# Patient Record
Sex: FEMALE | Race: WHITE | Employment: OTHER | URBAN - METROPOLITAN AREA
[De-identification: names, ages, dates, MRNs, and addresses within clinical notes are randomized per-mention and may not be internally consistent; named-entity substitution may affect disease eponyms.]

---

## 2017-01-05 PROBLEM — D37.5 NEOPLASM OF UNCERTAIN BEHAVIOR OF STOMACH, INTESTINES, AND RECTUM: Status: ACTIVE | Noted: 2017-01-05

## 2017-01-05 PROBLEM — D37.8 NEOPLASM OF UNCERTAIN BEHAVIOR OF STOMACH, INTESTINES, AND RECTUM: Status: ACTIVE | Noted: 2017-01-05

## 2017-01-05 PROBLEM — K58.0 IRRITABLE BOWEL SYNDROME WITH DIARRHEA: Status: ACTIVE | Noted: 2017-01-05

## 2017-01-05 PROBLEM — K58.9 IRRITABLE COLON: Status: ACTIVE | Noted: 2017-01-05

## 2017-01-05 PROBLEM — D37.1 NEOPLASM OF UNCERTAIN BEHAVIOR OF STOMACH, INTESTINES, AND RECTUM: Status: ACTIVE | Noted: 2017-01-05

## 2017-01-15 ENCOUNTER — HOSPITAL ENCOUNTER (OUTPATIENT)
Facility: HOSPITAL | Age: 62
Setting detail: OBSERVATION
Discharge: HOME | End: 2017-01-16
Attending: EMERGENCY MEDICINE | Admitting: INTERNAL MEDICINE
Payer: COMMERCIAL

## 2017-01-15 ENCOUNTER — APPOINTMENT (OUTPATIENT)
Dept: GENERAL RADIOLOGY | Facility: HOSPITAL | Age: 62
End: 2017-01-15
Attending: EMERGENCY MEDICINE
Payer: COMMERCIAL

## 2017-01-15 DIAGNOSIS — R07.9 ACUTE CHEST PAIN: Primary | ICD-10-CM

## 2017-01-15 LAB
BASOPHILS # BLD AUTO: 0.07 X10(3) UL (ref 0–0.1)
BASOPHILS NFR BLD AUTO: 0.6 %
EOSINOPHIL # BLD AUTO: 0.82 X10(3) UL (ref 0–0.3)
EOSINOPHIL NFR BLD AUTO: 7 %
ERYTHROCYTE [DISTWIDTH] IN BLOOD BY AUTOMATED COUNT: 13.4 % (ref 11.5–16)
HCT VFR BLD AUTO: 35.7 % (ref 34–50)
HGB BLD-MCNC: 11.5 G/DL (ref 12–16)
IMMATURE GRANULOCYTE COUNT: 0.05 X10(3) UL (ref 0–1)
IMMATURE GRANULOCYTE RATIO %: 0.4 %
LYMPHOCYTES # BLD AUTO: 3.06 X10(3) UL (ref 0.9–4)
LYMPHOCYTES NFR BLD AUTO: 26.1 %
MCH RBC QN AUTO: 27.3 PG (ref 27–33.2)
MCHC RBC AUTO-ENTMCNC: 32.2 G/DL (ref 31–37)
MCV RBC AUTO: 84.8 FL (ref 81–100)
MONOCYTES # BLD AUTO: 0.62 X10(3) UL (ref 0.1–0.6)
MONOCYTES NFR BLD AUTO: 5.3 %
NEUTROPHIL ABS PRELIM: 7.09 X10 (3) UL (ref 1.3–6.7)
NEUTROPHILS # BLD AUTO: 7.09 X10(3) UL (ref 1.3–6.7)
NEUTROPHILS NFR BLD AUTO: 60.6 %
PLATELET # BLD AUTO: 306 10(3)UL (ref 150–450)
RBC # BLD AUTO: 4.21 X10(6)UL (ref 3.8–5.1)
RED CELL DISTRIBUTION WIDTH-SD: 41.5 FL (ref 35.1–46.3)
WBC # BLD AUTO: 11.7 X10(3) UL (ref 4–13)

## 2017-01-15 PROCEDURE — 71010 XR CHEST AP PORTABLE  (CPT=71010): CPT

## 2017-01-15 PROCEDURE — 93005 ELECTROCARDIOGRAM TRACING: CPT

## 2017-01-15 PROCEDURE — 85025 COMPLETE CBC W/AUTO DIFF WBC: CPT | Performed by: EMERGENCY MEDICINE

## 2017-01-15 PROCEDURE — 93010 ELECTROCARDIOGRAM REPORT: CPT

## 2017-01-15 PROCEDURE — 36415 COLL VENOUS BLD VENIPUNCTURE: CPT

## 2017-01-15 PROCEDURE — 99285 EMERGENCY DEPT VISIT HI MDM: CPT

## 2017-01-15 PROCEDURE — 84484 ASSAY OF TROPONIN QUANT: CPT | Performed by: EMERGENCY MEDICINE

## 2017-01-15 PROCEDURE — 80053 COMPREHEN METABOLIC PANEL: CPT | Performed by: EMERGENCY MEDICINE

## 2017-01-15 RX ORDER — INSULIN LISPRO 100 [IU]/ML
INJECTION, SOLUTION INTRAVENOUS; SUBCUTANEOUS
Status: ON HOLD | COMMUNITY
End: 2017-01-16

## 2017-01-15 RX ORDER — ASPIRIN 81 MG/1
324 TABLET, CHEWABLE ORAL ONCE
Status: COMPLETED | OUTPATIENT
Start: 2017-01-15 | End: 2017-01-15

## 2017-01-16 ENCOUNTER — APPOINTMENT (OUTPATIENT)
Dept: ULTRASOUND IMAGING | Facility: HOSPITAL | Age: 62
End: 2017-01-16
Attending: HOSPITALIST
Payer: COMMERCIAL

## 2017-01-16 ENCOUNTER — APPOINTMENT (OUTPATIENT)
Dept: CV DIAGNOSTICS | Facility: HOSPITAL | Age: 62
End: 2017-01-16
Attending: INTERNAL MEDICINE
Payer: COMMERCIAL

## 2017-01-16 VITALS
OXYGEN SATURATION: 99 % | WEIGHT: 293 LBS | BODY MASS INDEX: 47.09 KG/M2 | TEMPERATURE: 98 F | RESPIRATION RATE: 18 BRPM | HEIGHT: 66 IN | HEART RATE: 48 BPM | DIASTOLIC BLOOD PRESSURE: 73 MMHG | SYSTOLIC BLOOD PRESSURE: 128 MMHG

## 2017-01-16 PROBLEM — R07.9 ACUTE CHEST PAIN: Status: ACTIVE | Noted: 2017-01-16

## 2017-01-16 LAB
ALBUMIN SERPL-MCNC: 3.6 G/DL (ref 3.5–4.8)
ALP LIVER SERPL-CCNC: 102 U/L (ref 46–118)
ALT SERPL-CCNC: 14 U/L (ref 14–54)
AST SERPL-CCNC: 11 U/L (ref 15–41)
BASOPHILS # BLD AUTO: 0.08 X10(3) UL (ref 0–0.1)
BASOPHILS NFR BLD AUTO: 0.8 %
BILIRUB SERPL-MCNC: 0.2 MG/DL (ref 0.1–2)
BUN BLD-MCNC: 28 MG/DL (ref 8–20)
BUN BLD-MCNC: 30 MG/DL (ref 8–20)
CALCIUM BLD-MCNC: 9 MG/DL (ref 8.3–10.3)
CALCIUM BLD-MCNC: 9.3 MG/DL (ref 8.3–10.3)
CHLORIDE: 106 MMOL/L (ref 101–111)
CHLORIDE: 110 MMOL/L (ref 101–111)
CO2: 26 MMOL/L (ref 22–32)
CO2: 29 MMOL/L (ref 22–32)
CREAT BLD-MCNC: 0.76 MG/DL (ref 0.55–1.02)
CREAT BLD-MCNC: 0.92 MG/DL (ref 0.55–1.02)
D-DIMER: 0.48 UG/ML FEU (ref 0–0.49)
EOSINOPHIL # BLD AUTO: 0.75 X10(3) UL (ref 0–0.3)
EOSINOPHIL NFR BLD AUTO: 7.4 %
ERYTHROCYTE [DISTWIDTH] IN BLOOD BY AUTOMATED COUNT: 13.5 % (ref 11.5–16)
EST. AVERAGE GLUCOSE BLD GHB EST-MCNC: 171 MG/DL (ref 68–126)
GLUCOSE BLD-MCNC: 136 MG/DL (ref 65–99)
GLUCOSE BLD-MCNC: 169 MG/DL (ref 70–99)
GLUCOSE BLD-MCNC: 171 MG/DL (ref 65–99)
GLUCOSE BLD-MCNC: 171 MG/DL (ref 70–99)
GLUCOSE BLD-MCNC: 185 MG/DL (ref 65–99)
GLUCOSE BLD-MCNC: 190 MG/DL (ref 65–99)
HBA1C MFR BLD HPLC: 7.6 % (ref ?–5.7)
HCT VFR BLD AUTO: 33.6 % (ref 34–50)
HGB BLD-MCNC: 10.6 G/DL (ref 12–16)
IMMATURE GRANULOCYTE COUNT: 0.03 X10(3) UL (ref 0–1)
IMMATURE GRANULOCYTE RATIO %: 0.3 %
LYMPHOCYTES # BLD AUTO: 2.57 X10(3) UL (ref 0.9–4)
LYMPHOCYTES NFR BLD AUTO: 25.4 %
M PROTEIN MFR SERPL ELPH: 7.2 G/DL (ref 6.1–8.3)
MCH RBC QN AUTO: 27.2 PG (ref 27–33.2)
MCHC RBC AUTO-ENTMCNC: 31.5 G/DL (ref 31–37)
MCV RBC AUTO: 86.4 FL (ref 81–100)
MONOCYTES # BLD AUTO: 0.55 X10(3) UL (ref 0.1–0.6)
MONOCYTES NFR BLD AUTO: 5.4 %
NEUTROPHIL ABS PRELIM: 6.12 X10 (3) UL (ref 1.3–6.7)
NEUTROPHILS # BLD AUTO: 6.12 X10(3) UL (ref 1.3–6.7)
NEUTROPHILS NFR BLD AUTO: 60.7 %
PLATELET # BLD AUTO: 260 10(3)UL (ref 150–450)
POTASSIUM SERPL-SCNC: 3.7 MMOL/L (ref 3.6–5.1)
POTASSIUM SERPL-SCNC: 3.9 MMOL/L (ref 3.6–5.1)
RBC # BLD AUTO: 3.89 X10(6)UL (ref 3.8–5.1)
RED CELL DISTRIBUTION WIDTH-SD: 42.2 FL (ref 35.1–46.3)
SODIUM SERPL-SCNC: 141 MMOL/L (ref 136–144)
SODIUM SERPL-SCNC: 143 MMOL/L (ref 136–144)
TROPONIN: <0.046 NG/ML (ref ?–0.05)
WBC # BLD AUTO: 10.1 X10(3) UL (ref 4–13)

## 2017-01-16 PROCEDURE — 93306 TTE W/DOPPLER COMPLETE: CPT | Performed by: INTERNAL MEDICINE

## 2017-01-16 PROCEDURE — 93971 EXTREMITY STUDY: CPT

## 2017-01-16 PROCEDURE — 80048 BASIC METABOLIC PNL TOTAL CA: CPT | Performed by: HOSPITALIST

## 2017-01-16 PROCEDURE — 93306 TTE W/DOPPLER COMPLETE: CPT

## 2017-01-16 PROCEDURE — 83036 HEMOGLOBIN GLYCOSYLATED A1C: CPT | Performed by: HOSPITALIST

## 2017-01-16 PROCEDURE — 85025 COMPLETE CBC W/AUTO DIFF WBC: CPT | Performed by: HOSPITALIST

## 2017-01-16 PROCEDURE — 96372 THER/PROPH/DIAG INJ SC/IM: CPT

## 2017-01-16 PROCEDURE — 93005 ELECTROCARDIOGRAM TRACING: CPT

## 2017-01-16 PROCEDURE — 93010 ELECTROCARDIOGRAM REPORT: CPT | Performed by: INTERNAL MEDICINE

## 2017-01-16 PROCEDURE — 84484 ASSAY OF TROPONIN QUANT: CPT | Performed by: HOSPITALIST

## 2017-01-16 PROCEDURE — 82962 GLUCOSE BLOOD TEST: CPT

## 2017-01-16 PROCEDURE — 85378 FIBRIN DEGRADE SEMIQUANT: CPT | Performed by: HOSPITALIST

## 2017-01-16 RX ORDER — MORPHINE SULFATE 2 MG/ML
2 INJECTION, SOLUTION INTRAMUSCULAR; INTRAVENOUS EVERY 4 HOURS PRN
Status: DISCONTINUED | OUTPATIENT
Start: 2017-01-16 | End: 2017-01-16

## 2017-01-16 RX ORDER — ONDANSETRON 2 MG/ML
4 INJECTION INTRAMUSCULAR; INTRAVENOUS EVERY 6 HOURS PRN
Status: DISCONTINUED | OUTPATIENT
Start: 2017-01-16 | End: 2017-01-16

## 2017-01-16 RX ORDER — HEPARIN SODIUM 5000 [USP'U]/ML
5000 INJECTION, SOLUTION INTRAVENOUS; SUBCUTANEOUS EVERY 8 HOURS SCHEDULED
Status: DISCONTINUED | OUTPATIENT
Start: 2017-01-16 | End: 2017-01-16

## 2017-01-16 RX ORDER — ATORVASTATIN CALCIUM 20 MG/1
20 TABLET, FILM COATED ORAL DAILY
Status: DISCONTINUED | OUTPATIENT
Start: 2017-01-16 | End: 2017-01-16

## 2017-01-16 RX ORDER — ERGOCALCIFEROL (VITAMIN D2) 1250 MCG
CAPSULE ORAL
COMMUNITY
End: 2017-03-08

## 2017-01-16 RX ORDER — MORPHINE SULFATE 2 MG/ML
1 INJECTION, SOLUTION INTRAMUSCULAR; INTRAVENOUS EVERY 4 HOURS PRN
Status: DISCONTINUED | OUTPATIENT
Start: 2017-01-16 | End: 2017-01-16

## 2017-01-16 RX ORDER — ASPIRIN 81 MG/1
81 TABLET ORAL NIGHTLY
COMMUNITY
End: 2019-03-09

## 2017-01-16 RX ORDER — INSULIN LISPRO 100 [IU]/ML
INJECTION, SOLUTION INTRAVENOUS; SUBCUTANEOUS
COMMUNITY
End: 2017-05-04

## 2017-01-16 RX ORDER — ALPRAZOLAM 0.5 MG/1
0.5 TABLET ORAL 3 TIMES DAILY PRN
Status: DISCONTINUED | OUTPATIENT
Start: 2017-01-16 | End: 2017-01-16

## 2017-01-16 RX ORDER — ASPIRIN 325 MG
325 TABLET, DELAYED RELEASE (ENTERIC COATED) ORAL DAILY
Status: DISCONTINUED | OUTPATIENT
Start: 2017-01-16 | End: 2017-01-16

## 2017-01-16 RX ORDER — SPIRONOLACTONE 25 MG/1
25 TABLET ORAL
Status: DISCONTINUED | OUTPATIENT
Start: 2017-01-16 | End: 2017-01-16

## 2017-01-16 RX ORDER — ALPRAZOLAM 0.5 MG/1
0.5 TABLET ORAL 3 TIMES DAILY PRN
COMMUNITY
End: 2017-05-11

## 2017-01-16 RX ORDER — ACETAMINOPHEN 325 MG/1
650 TABLET ORAL EVERY 6 HOURS PRN
Status: DISCONTINUED | OUTPATIENT
Start: 2017-01-16 | End: 2017-01-16

## 2017-01-16 RX ORDER — POTASSIUM CHLORIDE 20 MEQ/1
40 TABLET, EXTENDED RELEASE ORAL ONCE
Status: COMPLETED | OUTPATIENT
Start: 2017-01-16 | End: 2017-01-16

## 2017-01-16 RX ORDER — LOSARTAN POTASSIUM AND HYDROCHLOROTHIAZIDE 25; 100 MG/1; MG/1
1 TABLET ORAL
Status: DISCONTINUED | OUTPATIENT
Start: 2017-01-16 | End: 2017-01-16 | Stop reason: RX

## 2017-01-16 RX ORDER — DEXTROSE MONOHYDRATE 25 G/50ML
50 INJECTION, SOLUTION INTRAVENOUS
Status: DISCONTINUED | OUTPATIENT
Start: 2017-01-16 | End: 2017-01-16

## 2017-01-16 NOTE — CONSULTS
[facility] Cardiology Consultation    Bernhard Schaumann Patient Status:  Observation    1955 MRN [id number]   [place] 8NE-A Attending Evert Swenson MD   Hosp Day # 1 PCP Ricki Contreras MD     Reason for Consultation:  Chest pain HISTORY Bilateral 1999    Comment bunionectomy    OTHER SURGICAL HISTORY Bilateral 2003    Comment hammertoe correction    REMOVAL GALLBLADDER      REPAIR ING HERNIA,5+Y/O,REDUCIBL      KNEE REPLACEMENT SURGERY      EGD SCOPE  6/07    TONSILLECTOMY  1964 losartan-hydrochlorothiazide (HYZAAR 100/25) combination tablet   Oral Nightly   • aspirin EC  325 mg Oral Daily   • insulin aspart  2-10 Units Subcutaneous TID CC and HS       Continuous Infusions:       Social History:   reports that she has been smoking feel she can be d/c'd. Because of CV risk factors, I would recommend o/p cardiolite. Echo pending.     Isaac Edmond  1/16/2017  12:32 PM

## 2017-01-16 NOTE — PROGRESS NOTES
01/16/17 0217 01/16/17 0220 01/16/17 0222   Vital Signs   Pulse 64 57 55   Heart Rate Source Monitor --  --    Resp 18 --  --    /40 mmHg 119/50 mmHg 120/57 mmHg   BP Location Left arm Left arm Left arm   BP Method Automatic Automatic Automatic

## 2017-01-16 NOTE — H&P
[facility] hospitalist H+P    PCP;Enrique Knutson MD  CC:  pain    HPI 62 yo female with obesity (BMI 47), HTN, Dm2, depression, came with chest and right sided upper arm pain.  Intermittent, sharp, right sided, was occuring every 30 minutes and last few SPLINT, HAMMER TOE Left 1998    CARPAL TUNNEL RELEASE Left 2015    CHOLECYSTECTOMY  1987    COLONOSCOPY  2013    Comment had multiple colonosopy's prior to 2013 also    UPPER GI ENDOSCOPY,EXAM  [facility] to Encounter:  TRADJENTA 5 MG Oral Tab TAKE 1 TABLET BY MOUTH EVERY DAY Disp: 30 tablet Rfl: 5   SERTRALINE  MG Oral Tab TAKE 1 & 1/2 TABLETS (150 MG TOTAL) BY MOUTH DAILY.  Disp: 45 tablet Rfl: 5   Atorvastatin Calcium 20 MG Oral Tab Take 1 tablet ( 27.2   MCHC  31.5   RDW  13.5   NEPRELIM  6.12   WBC  10.1   PLT  260.0       D-dimer 0.48    Na 143, k 3.9 Cl 110 Co 26 BUN 28 Creatinine 0.76 glucose 171  CXR (personally reviewed result in EPIC) no focal consolidations      Assessment/plan  Right sided

## 2017-01-16 NOTE — ED PROVIDER NOTES
Patient Seen in: [facility] Emergency Department    History   Patient presents with:  Upper Extremity Injury (musculoskeletal)    Stated Complaint: rt shoulder pain    HPI    Patient is a 53-year-old female comes into emergency room with right-sided 1993    Comment procedure to remove cyst from pancreas, developed abscess and required 2nd procedure    OTHER SURGICAL HISTORY Left 1996    Comment procedure to remove strep infection from left thigh    OTHER SURGICAL HISTORY Bilateral 1999    Comment sandra Subcutaneous Solution Pen-injector,  60 units daily   MetFORMIN HCl 1000 MG Oral Tab,  Take 1 tablet (1,000 mg total) by mouth 2 (two) times daily.    LOSARTAN POTASSIUM-HCTZ 100-25 MG Oral Tab,  TAKE 1 TABLET BY MOUTH EVERY DAY   Meclizine HCl (ANTIVERT) 2 01/15/17 2301 Temporal   SpO2 01/15/17 2301 99 %   O2 Device 01/15/17 2301 None (Room air)       Current:/57 mmHg  Pulse 55  Temp(Src) 97.7 °F (36.5 °C) (Oral)  Resp 18  Ht 167.6 cm (5' 6\")  Wt 133.7 kg  BMI 47.60 kg/m2  SpO2 100%  Breastfeeding?  No (*)     Neutrophil Absolute 7.09 (*)     Monocyte Absolute 0.62 (*)     Eosinophil Absolute 0.82 (*)     All other components within normal limits   TROPONIN I - Normal   CBC WITH DIFFERENTIAL WITH PLATELET    Narrative:      The following orders were creat radiating to right arm. Recommend admission to hospital for further workup to rule out cardiac cause of symptoms. Workup and results were discussed with patient. Patient has no other questions, complaints or concerns.  Patient will be admitted to the hosp

## 2017-01-16 NOTE — PAYOR COMM NOTE
Attending Physician: Aubrey Last MD    Review Type: ADMISSION   Reviewer: Vitaliy Canales       Date: January 16, 2017 - 9:41 AM  Payor: [id number] Number: N/A  Admit date: 1/15/2017 10:53 PM   Admitted from Emergency Dept. Units     Date Action Dose Route User    1/16/2017 0656 Given 5000 Units Subcutaneous (Right Lower Abdomen) Anila Donaldson, RN      insulin aspart (NOVOLOG) 100 UNIT/ML flexpen 2-10 Units     Date Action Dose Route User    1/16/2017 0919 Given 2 Units Subcu components within normal limits   TROPONIN I - Normal   TROPONIN I - Normal   D-DIMER - Normal    Narrative:     FEU = Fibrinogen Equivalent Units.     D-Dimer results of less than 0.5 ug/mL (FEU) have been shown to contribute to the exclusion of venous thr

## 2017-01-16 NOTE — ED INITIAL ASSESSMENT (HPI)
Pt c/o right arm pain radiating into chest, intermittently since 1600 today. Not worse with movement, no strenuous activity.

## 2017-01-17 LAB
ATRIAL RATE: 55 BPM
ATRIAL RATE: 59 BPM
P AXIS: 264 DEGREES
P AXIS: 79 DEGREES
P-R INTERVAL: 126 MS
P-R INTERVAL: 144 MS
Q-T INTERVAL: 434 MS
Q-T INTERVAL: 448 MS
QRS DURATION: 100 MS
QRS DURATION: 106 MS
QTC CALCULATION (BEZET): 428 MS
QTC CALCULATION (BEZET): 429 MS
R AXIS: 19 DEGREES
R AXIS: 26 DEGREES
T AXIS: 10 DEGREES
T AXIS: 19 DEGREES
VENTRICULAR RATE: 55 BPM
VENTRICULAR RATE: 59 BPM

## 2017-02-06 ENCOUNTER — HOSPITAL ENCOUNTER (OUTPATIENT)
Facility: HOSPITAL | Age: 62
Setting detail: HOSPITAL OUTPATIENT SURGERY
Discharge: HOME | End: 2017-02-06
Attending: INTERNAL MEDICINE | Admitting: INTERNAL MEDICINE
Payer: COMMERCIAL

## 2017-02-06 ENCOUNTER — SURGERY (OUTPATIENT)
Age: 62
End: 2017-02-06

## 2017-02-06 VITALS
OXYGEN SATURATION: 99 % | SYSTOLIC BLOOD PRESSURE: 119 MMHG | WEIGHT: 280 LBS | BODY MASS INDEX: 45 KG/M2 | HEIGHT: 66 IN | HEART RATE: 60 BPM | RESPIRATION RATE: 12 BRPM | DIASTOLIC BLOOD PRESSURE: 67 MMHG

## 2017-02-06 LAB — GLUCOSE BLDC GLUCOMTR-MCNC: 126 MG/DL (ref 70–99)

## 2017-02-06 PROCEDURE — 82962 GLUCOSE BLOOD TEST: CPT

## 2017-02-06 PROCEDURE — 88305 TISSUE EXAM BY PATHOLOGIST: CPT | Performed by: INTERNAL MEDICINE

## 2017-02-06 PROCEDURE — 0DBK8ZX EXCISION OF ASCENDING COLON, VIA NATURAL OR ARTIFICIAL OPENING ENDOSCOPIC, DIAGNOSTIC: ICD-10-PCS | Performed by: INTERNAL MEDICINE

## 2017-02-06 RX ORDER — SODIUM CHLORIDE, SODIUM LACTATE, POTASSIUM CHLORIDE, CALCIUM CHLORIDE 600; 310; 30; 20 MG/100ML; MG/100ML; MG/100ML; MG/100ML
INJECTION, SOLUTION INTRAVENOUS CONTINUOUS
Status: CANCELLED | OUTPATIENT
Start: 2017-02-06

## 2017-02-06 RX ORDER — SODIUM CHLORIDE 0.9 % (FLUSH) 0.9 %
10 SYRINGE (ML) INJECTION AS NEEDED
Status: CANCELLED | OUTPATIENT
Start: 2017-02-06

## 2017-02-06 RX ORDER — MIDAZOLAM HYDROCHLORIDE 1 MG/ML
1 INJECTION INTRAMUSCULAR; INTRAVENOUS EVERY 5 MIN PRN
Status: DISCONTINUED | OUTPATIENT
Start: 2017-02-06 | End: 2017-02-06

## 2017-02-06 NOTE — H&P
RE-PROCEDURE UPDATE    HPI: Basil Chaidez is a 63year old female. 4/25/1955. Patient presents for a colonoscopy.     ALLERGIES:   Adhesive Tape           Rash  Bactrim [Sulfametho*    Swelling    Comment:Eyelids swelled  Cefadroxil                Salsalat 1998    SPLINT, HAMMER TOE Left 1998    CARPAL TUNNEL RELEASE Left 2015    CHOLECYSTECTOMY  1987    COLONOSCOPY  2013, 2/17    Comment had multiple colonosopy's prior to 2013 also    UPPER GI ENDOSCOPY,EXAM  [address]

## 2017-02-06 NOTE — OPERATIVE REPORT
COLONOSCOPY REPORT    Patient Name:  Jenni Alva Record #: [id number]  YOB: 1955  Date of Procedure: 2/6/2017    Referring physician: Wanetta Lefort, MD    Surgeon:  Michael Kim.  Sweetie Storm MD    Pre-op diagnosis: History of polyps  (L

## 2017-02-15 PROCEDURE — 83735 ASSAY OF MAGNESIUM: CPT | Performed by: INTERNAL MEDICINE

## 2017-02-15 PROCEDURE — 85025 COMPLETE CBC W/AUTO DIFF WBC: CPT | Performed by: FAMILY MEDICINE

## 2017-02-15 PROCEDURE — 36415 COLL VENOUS BLD VENIPUNCTURE: CPT | Performed by: INTERNAL MEDICINE

## 2017-03-08 PROBLEM — IMO0002 UNCONTROLLED TYPE 2 DIABETES MELLITUS WITH STAGE 3 CHRONIC KIDNEY DISEASE, WITH LONG-TERM CURRENT USE OF INSULIN: Status: ACTIVE | Noted: 2017-03-08

## 2017-03-08 PROBLEM — N18.30 UNCONTROLLED TYPE 2 DIABETES MELLITUS WITH STAGE 3 CHRONIC KIDNEY DISEASE, WITH LONG-TERM CURRENT USE OF INSULIN (HCC): Status: ACTIVE | Noted: 2017-03-08

## 2017-03-08 PROBLEM — E11.22 UNCONTROLLED TYPE 2 DIABETES MELLITUS WITH STAGE 3 CHRONIC KIDNEY DISEASE, WITH LONG-TERM CURRENT USE OF INSULIN (HCC): Status: ACTIVE | Noted: 2017-03-08

## 2017-03-08 PROBLEM — E11.65 UNCONTROLLED TYPE 2 DIABETES MELLITUS WITH STAGE 3 CHRONIC KIDNEY DISEASE, WITH LONG-TERM CURRENT USE OF INSULIN (HCC): Status: ACTIVE | Noted: 2017-03-08

## 2017-03-08 PROBLEM — Z79.4 UNCONTROLLED TYPE 2 DIABETES MELLITUS WITH STAGE 3 CHRONIC KIDNEY DISEASE, WITH LONG-TERM CURRENT USE OF INSULIN (HCC): Status: ACTIVE | Noted: 2017-03-08

## 2017-03-08 PROCEDURE — 80061 LIPID PANEL: CPT | Performed by: INTERNAL MEDICINE

## 2017-03-08 PROCEDURE — 82306 VITAMIN D 25 HYDROXY: CPT | Performed by: INTERNAL MEDICINE

## 2017-03-08 PROCEDURE — 36415 COLL VENOUS BLD VENIPUNCTURE: CPT | Performed by: INTERNAL MEDICINE

## 2017-05-01 PROBLEM — G89.29 CHRONIC RIGHT SACROILIAC PAIN: Status: ACTIVE | Noted: 2017-05-01

## 2017-05-01 PROBLEM — M53.3 CHRONIC RIGHT SACROILIAC PAIN: Status: ACTIVE | Noted: 2017-05-01

## 2017-05-16 PROBLEM — E66.01 MORBID OBESITY WITH BMI OF 45.0-49.9, ADULT (HCC): Status: ACTIVE | Noted: 2017-05-16

## 2017-05-16 PROBLEM — Z96.653 STATUS POST TOTAL BILATERAL KNEE REPLACEMENT: Status: RESOLVED | Noted: 2017-05-16 | Resolved: 2017-05-16

## 2017-05-16 PROBLEM — Z96.653 STATUS POST TOTAL BILATERAL KNEE REPLACEMENT: Status: ACTIVE | Noted: 2017-05-16

## 2017-05-16 PROBLEM — R07.9 ACUTE CHEST PAIN: Status: RESOLVED | Noted: 2017-01-16 | Resolved: 2017-05-16

## 2017-05-16 PROBLEM — D37.1 NEOPLASM OF UNCERTAIN BEHAVIOR OF STOMACH, INTESTINES, AND RECTUM: Status: RESOLVED | Noted: 2017-01-05 | Resolved: 2017-05-16

## 2017-05-16 PROBLEM — M25.361 KNEE INSTABILITY, RIGHT: Status: RESOLVED | Noted: 2017-05-16 | Resolved: 2017-05-16

## 2017-05-16 PROBLEM — M25.361 KNEE INSTABILITY, RIGHT: Status: ACTIVE | Noted: 2017-05-16

## 2017-05-16 PROBLEM — D37.5 NEOPLASM OF UNCERTAIN BEHAVIOR OF STOMACH, INTESTINES, AND RECTUM: Status: RESOLVED | Noted: 2017-01-05 | Resolved: 2017-05-16

## 2017-05-16 PROBLEM — D37.8 NEOPLASM OF UNCERTAIN BEHAVIOR OF STOMACH, INTESTINES, AND RECTUM: Status: RESOLVED | Noted: 2017-01-05 | Resolved: 2017-05-16

## 2017-05-26 PROBLEM — G89.29 CHRONIC PAIN OF RIGHT KNEE: Status: ACTIVE | Noted: 2017-05-26

## 2017-05-26 PROBLEM — M25.561 CHRONIC PAIN OF RIGHT KNEE: Status: ACTIVE | Noted: 2017-05-26

## 2017-08-02 PROBLEM — M18.12 ARTHRITIS OF CARPOMETACARPAL (CMC) JOINT OF LEFT THUMB: Status: ACTIVE | Noted: 2017-08-02

## 2017-09-13 ENCOUNTER — HOSPITAL ENCOUNTER (EMERGENCY)
Facility: HOSPITAL | Age: 62
Discharge: HOME | End: 2017-09-13
Attending: EMERGENCY MEDICINE
Payer: MEDICARE

## 2017-09-13 ENCOUNTER — APPOINTMENT (OUTPATIENT)
Dept: GENERAL RADIOLOGY | Facility: HOSPITAL | Age: 62
End: 2017-09-13
Attending: EMERGENCY MEDICINE
Payer: MEDICARE

## 2017-09-13 VITALS
HEIGHT: 66 IN | OXYGEN SATURATION: 100 % | BODY MASS INDEX: 47.09 KG/M2 | SYSTOLIC BLOOD PRESSURE: 116 MMHG | TEMPERATURE: 97 F | DIASTOLIC BLOOD PRESSURE: 57 MMHG | HEART RATE: 18 BPM | WEIGHT: 293 LBS | RESPIRATION RATE: 16 BRPM

## 2017-09-13 DIAGNOSIS — I49.1 PAC (PREMATURE ATRIAL CONTRACTION): Primary | ICD-10-CM

## 2017-09-13 LAB
ALBUMIN SERPL-MCNC: 3.5 G/DL (ref 3.5–4.8)
ALP LIVER SERPL-CCNC: 110 U/L (ref 46–118)
ALT SERPL-CCNC: 15 U/L (ref 14–54)
AST SERPL-CCNC: 12 U/L (ref 15–41)
BASOPHILS # BLD AUTO: 0.04 X10(3) UL (ref 0–0.1)
BASOPHILS NFR BLD AUTO: 0.3 %
BILIRUB SERPL-MCNC: 0.2 MG/DL (ref 0.1–2)
BUN BLD-MCNC: 26 MG/DL (ref 8–20)
CALCIUM BLD-MCNC: 9.6 MG/DL (ref 8.3–10.3)
CHLORIDE: 104 MMOL/L (ref 101–111)
CO2: 29 MMOL/L (ref 22–32)
CREAT BLD-MCNC: 1.07 MG/DL (ref 0.55–1.02)
EOSINOPHIL # BLD AUTO: 0.79 X10(3) UL (ref 0–0.3)
EOSINOPHIL NFR BLD AUTO: 6.7 %
ERYTHROCYTE [DISTWIDTH] IN BLOOD BY AUTOMATED COUNT: 14 % (ref 11.5–16)
GLUCOSE BLD-MCNC: 186 MG/DL (ref 70–99)
HCT VFR BLD AUTO: 36.8 % (ref 34–50)
HGB BLD-MCNC: 11.7 G/DL (ref 12–16)
IMMATURE GRANULOCYTE COUNT: 0.04 X10(3) UL (ref 0–1)
IMMATURE GRANULOCYTE RATIO %: 0.3 %
LYMPHOCYTES # BLD AUTO: 3.11 X10(3) UL (ref 0.9–4)
LYMPHOCYTES NFR BLD AUTO: 26.5 %
M PROTEIN MFR SERPL ELPH: 7.6 G/DL (ref 6.1–8.3)
MCH RBC QN AUTO: 27.3 PG (ref 27–33.2)
MCHC RBC AUTO-ENTMCNC: 31.8 G/DL (ref 31–37)
MCV RBC AUTO: 85.8 FL (ref 81–100)
MONOCYTES # BLD AUTO: 0.7 X10(3) UL (ref 0.1–0.6)
MONOCYTES NFR BLD AUTO: 6 %
NEUTROPHIL ABS PRELIM: 7.04 X10 (3) UL (ref 1.3–6.7)
NEUTROPHILS # BLD AUTO: 7.04 X10(3) UL (ref 1.3–6.7)
NEUTROPHILS NFR BLD AUTO: 60.2 %
PLATELET # BLD AUTO: 295 10(3)UL (ref 150–450)
POTASSIUM SERPL-SCNC: 3.8 MMOL/L (ref 3.6–5.1)
RBC # BLD AUTO: 4.29 X10(6)UL (ref 3.8–5.1)
RED CELL DISTRIBUTION WIDTH-SD: 43.7 FL (ref 35.1–46.3)
SODIUM SERPL-SCNC: 140 MMOL/L (ref 136–144)
TROPONIN: <0.046 NG/ML (ref ?–0.05)
WBC # BLD AUTO: 11.7 X10(3) UL (ref 4–13)

## 2017-09-13 PROCEDURE — 93005 ELECTROCARDIOGRAM TRACING: CPT

## 2017-09-13 PROCEDURE — 93010 ELECTROCARDIOGRAM REPORT: CPT

## 2017-09-13 PROCEDURE — 80053 COMPREHEN METABOLIC PANEL: CPT | Performed by: EMERGENCY MEDICINE

## 2017-09-13 PROCEDURE — 96360 HYDRATION IV INFUSION INIT: CPT

## 2017-09-13 PROCEDURE — 71010 XR CHEST AP PORTABLE  (CPT=71010): CPT | Performed by: EMERGENCY MEDICINE

## 2017-09-13 PROCEDURE — 85025 COMPLETE CBC W/AUTO DIFF WBC: CPT | Performed by: EMERGENCY MEDICINE

## 2017-09-13 PROCEDURE — 99285 EMERGENCY DEPT VISIT HI MDM: CPT

## 2017-09-13 PROCEDURE — 84484 ASSAY OF TROPONIN QUANT: CPT | Performed by: EMERGENCY MEDICINE

## 2017-09-13 RX ORDER — SODIUM CHLORIDE 9 MG/ML
125 INJECTION, SOLUTION INTRAVENOUS CONTINUOUS
Status: DISCONTINUED | OUTPATIENT
Start: 2017-09-13 | End: 2017-09-14

## 2017-09-14 LAB
ATRIAL RATE: 64 BPM
P AXIS: 72 DEGREES
P-R INTERVAL: 136 MS
Q-T INTERVAL: 430 MS
QRS DURATION: 100 MS
QTC CALCULATION (BEZET): 443 MS
R AXIS: 14 DEGREES
T AXIS: 19 DEGREES
VENTRICULAR RATE: 64 BPM

## 2017-09-14 NOTE — ED PROVIDER NOTES
Patient Seen in: [facility] Emergency Department    History   Patient presents with:  Arrythmia/Palpitations (cardiovascular)    Stated Complaint: heart palpitations x 2 days    HPI    63-year-old female comes the hospital the chief complaint of hav Location: [facility] ENDOSCOPY  1996: CORRECT CHARLES NIX METHODS Right  1996: CORRECT CHARLES NIX METHODS Left  6/07: EGD SCOPE  1992: ERCP,DIAGNOSTIC  1991: HERNIA SURGERY      Comment: umbilical hernia   33/1629: HERNIA SURGERY      Comment: abdominal hernia x palpitations x 2 days  Other systems are as noted in HPI. Constitutional and vital signs reviewed. All other systems reviewed and negative except as noted above. PSFH elements reviewed from today and agreed except as otherwise stated in HPI.     Ph Abnormal            Final result                 Please view results for these tests on the individual orders.    RAINBOW DRAW BLUE   RAINBOW DRAW LAVENDER   RAINBOW DRAW LIGHT GREEN   RAINBOW DRAW GOLD     EKG    Rate, intervals and axes as noted on

## 2017-09-14 NOTE — ED INITIAL ASSESSMENT (HPI)
C/o intermittent palpitations for 3 days.  Pt denies CP, denies ROSEMARY, denies dizziness/lightheadedness

## 2017-09-14 NOTE — ED NOTES
Unsuccesful PIV insertion x 2 attempts. Pt tolerated well. Will ask ultrasound guided insertion by another RN

## 2017-10-05 PROBLEM — M72.2 PLANTAR FASCIITIS, BILATERAL: Status: ACTIVE | Noted: 2017-10-05

## 2017-11-16 ENCOUNTER — HOSPITAL ENCOUNTER (EMERGENCY)
Facility: HOSPITAL | Age: 62
Discharge: HOME | End: 2017-11-16
Attending: EMERGENCY MEDICINE
Payer: MEDICARE

## 2017-11-16 VITALS
SYSTOLIC BLOOD PRESSURE: 140 MMHG | BODY MASS INDEX: 45.52 KG/M2 | HEART RATE: 82 BPM | RESPIRATION RATE: 16 BRPM | TEMPERATURE: 97 F | OXYGEN SATURATION: 100 % | DIASTOLIC BLOOD PRESSURE: 69 MMHG | HEIGHT: 67 IN | WEIGHT: 290 LBS
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
## 2017-11-16 PROCEDURE — 90471 IMMUNIZATION ADMIN: CPT

## 2017-11-16 PROCEDURE — 99283 EMERGENCY DEPT VISIT LOW MDM: CPT

## 2017-11-16 RX ORDER — AMOXICILLIN AND CLAVULANATE POTASSIUM 875; 125 MG/1; MG/1
1 TABLET, FILM COATED ORAL ONCE
Status: COMPLETED | OUTPATIENT
Start: 2017-11-16 | End: 2017-11-16

## 2017-11-16 RX ORDER — AMOXICILLIN AND CLAVULANATE POTASSIUM 875; 125 MG/1; MG/1
1 TABLET, FILM COATED ORAL 2 TIMES DAILY
Qty: 14 TABLET | Refills: 0 | Status: SHIPPED | OUTPATIENT
Start: 2017-11-16 | End: 2017-11-23

## 2017-11-16 NOTE — ED PROVIDER NOTES
Patient Seen in: [facility] Emergency Department    History   Patient presents with:  Bite (integumentary)    Stated Complaint: dog bite today    HPI    Patient is a 63-year-old woman who accidentally got bitten by her dog in the right hand today. procedure to remove cyst from pancreas,                developed abscess and required 2nd procedure  1996: OTHER SURGICAL HISTORY Left      Comment: procedure to remove strep infection from left                thigh  1999: OTHER SURGICAL HISTORY Bilateral murmurs. ABDOMEN: Soft, nontender, nondistended,   EXTREMITIES: Peripheral pulses are brisk in all 4 extremities. Normal capillary refill. Small scratch to right fourth finger. No active bleeding. No sign of crush injury.   No erythema or lymphangitic

## 2018-04-03 PROCEDURE — 87329 GIARDIA AG IA: CPT | Performed by: INTERNAL MEDICINE

## 2018-04-03 PROCEDURE — 87272 CRYPTOSPORIDIUM AG IF: CPT | Performed by: INTERNAL MEDICINE

## 2018-04-03 PROCEDURE — 82656 EL-1 FECAL QUAL/SEMIQ: CPT | Performed by: INTERNAL MEDICINE

## 2018-04-03 PROCEDURE — 82705 FATS/LIPIDS FECES QUAL: CPT | Performed by: INTERNAL MEDICINE

## 2018-04-11 ENCOUNTER — LAB ENCOUNTER (OUTPATIENT)
Dept: LAB | Age: 63
End: 2018-04-11
Attending: INTERNAL MEDICINE
Payer: COMMERCIAL

## 2018-04-11 DIAGNOSIS — E11.9 DIABETES MELLITUS WITH NO COMPLICATION (HCC): ICD-10-CM

## 2018-04-11 DIAGNOSIS — I10 ESSENTIAL HYPERTENSION: ICD-10-CM

## 2018-04-11 PROCEDURE — 82043 UR ALBUMIN QUANTITATIVE: CPT | Performed by: INTERNAL MEDICINE

## 2018-04-11 PROCEDURE — 81003 URINALYSIS AUTO W/O SCOPE: CPT

## 2018-04-11 PROCEDURE — 82570 ASSAY OF URINE CREATININE: CPT | Performed by: INTERNAL MEDICINE

## 2018-04-26 PROBLEM — G89.29 CHRONIC RIGHT SI JOINT PAIN: Status: ACTIVE | Noted: 2017-05-01

## 2018-04-26 PROBLEM — M53.3 CHRONIC RIGHT SI JOINT PAIN: Status: ACTIVE | Noted: 2017-05-01

## 2018-05-03 PROBLEM — M20.42 HAMMER TOES OF BOTH FEET: Status: ACTIVE | Noted: 2018-05-03

## 2018-05-03 PROBLEM — M20.41 HAMMER TOES OF BOTH FEET: Status: ACTIVE | Noted: 2018-05-03

## 2018-05-03 PROCEDURE — 83883 ASSAY NEPHELOMETRY NOT SPEC: CPT | Performed by: INTERNAL MEDICINE

## 2018-05-03 PROCEDURE — 86334 IMMUNOFIX E-PHORESIS SERUM: CPT | Performed by: INTERNAL MEDICINE

## 2018-05-03 PROCEDURE — 82785 ASSAY OF IGE: CPT | Performed by: INTERNAL MEDICINE

## 2018-05-03 PROCEDURE — 84260 ASSAY OF SEROTONIN: CPT | Performed by: INTERNAL MEDICINE

## 2018-05-03 PROCEDURE — 84165 PROTEIN E-PHORESIS SERUM: CPT | Performed by: INTERNAL MEDICINE

## 2018-05-03 PROCEDURE — 82784 ASSAY IGA/IGD/IGG/IGM EACH: CPT | Performed by: INTERNAL MEDICINE

## 2018-06-01 ENCOUNTER — EKG ENCOUNTER (OUTPATIENT)
Dept: LAB | Facility: HOSPITAL | Age: 63
End: 2018-06-01
Attending: INTERNAL MEDICINE
Payer: COMMERCIAL

## 2018-06-01 DIAGNOSIS — R00.0 IRREGULAR TACHYCARDIA: Primary | ICD-10-CM

## 2018-06-01 PROCEDURE — 93010 ELECTROCARDIOGRAM REPORT: CPT | Performed by: INTERNAL MEDICINE

## 2018-06-01 PROCEDURE — 93005 ELECTROCARDIOGRAM TRACING: CPT

## 2018-06-22 PROCEDURE — 87624 HPV HI-RISK TYP POOLED RSLT: CPT | Performed by: INTERNAL MEDICINE

## 2018-06-22 PROCEDURE — 88175 CYTOPATH C/V AUTO FLUID REDO: CPT | Performed by: INTERNAL MEDICINE

## 2018-07-15 ENCOUNTER — HOSPITAL ENCOUNTER (EMERGENCY)
Facility: HOSPITAL | Age: 63
Discharge: HOME | End: 2018-07-15
Attending: EMERGENCY MEDICINE
Payer: COMMERCIAL

## 2018-07-15 ENCOUNTER — APPOINTMENT (OUTPATIENT)
Dept: CT IMAGING | Facility: HOSPITAL | Age: 63
End: 2018-07-15
Attending: EMERGENCY MEDICINE
Payer: COMMERCIAL

## 2018-07-15 VITALS
DIASTOLIC BLOOD PRESSURE: 82 MMHG | SYSTOLIC BLOOD PRESSURE: 117 MMHG | TEMPERATURE: 102 F | RESPIRATION RATE: 14 BRPM | BODY MASS INDEX: 46.61 KG/M2 | OXYGEN SATURATION: 95 % | WEIGHT: 290 LBS | HEART RATE: 77 BPM | HEIGHT: 66 IN

## 2018-07-15 DIAGNOSIS — K52.9 GASTROENTERITIS: Primary | ICD-10-CM

## 2018-07-15 LAB
ALBUMIN SERPL-MCNC: 3.2 G/DL (ref 3.5–4.8)
ALP LIVER SERPL-CCNC: 99 U/L (ref 50–130)
ALT SERPL-CCNC: 16 U/L (ref 14–54)
AST SERPL-CCNC: 23 U/L (ref 15–41)
BASOPHILS # BLD AUTO: 0.03 X10(3) UL (ref 0–0.1)
BASOPHILS NFR BLD AUTO: 0.2 %
BILIRUB SERPL-MCNC: 0.4 MG/DL (ref 0.1–2)
BILIRUB UR QL STRIP.AUTO: NEGATIVE
BUN BLD-MCNC: 19 MG/DL (ref 8–20)
CALCIUM BLD-MCNC: 9.1 MG/DL (ref 8.3–10.3)
CHLORIDE: 104 MMOL/L (ref 101–111)
CO2: 23 MMOL/L (ref 22–32)
COLOR UR AUTO: YELLOW
CREAT BLD-MCNC: 0.99 MG/DL (ref 0.55–1.02)
EOSINOPHIL # BLD AUTO: 0.26 X10(3) UL (ref 0–0.3)
EOSINOPHIL NFR BLD AUTO: 1.4 %
ERYTHROCYTE [DISTWIDTH] IN BLOOD BY AUTOMATED COUNT: 13.5 % (ref 11.5–16)
GLUCOSE BLD-MCNC: 202 MG/DL (ref 70–99)
GLUCOSE UR STRIP.AUTO-MCNC: NEGATIVE MG/DL
HCT VFR BLD AUTO: 35.2 % (ref 34–50)
HGB BLD-MCNC: 11.3 G/DL (ref 12–16)
IMMATURE GRANULOCYTE COUNT: 0.09 X10(3) UL (ref 0–1)
IMMATURE GRANULOCYTE RATIO %: 0.5 %
KETONES UR STRIP.AUTO-MCNC: NEGATIVE MG/DL
LEUKOCYTE ESTERASE UR QL STRIP.AUTO: NEGATIVE
LYMPHOCYTES # BLD AUTO: 0.86 X10(3) UL (ref 0.9–4)
LYMPHOCYTES NFR BLD AUTO: 4.5 %
M PROTEIN MFR SERPL ELPH: 7.6 G/DL (ref 6.1–8.3)
MCH RBC QN AUTO: 27.3 PG (ref 27–33.2)
MCHC RBC AUTO-ENTMCNC: 32.1 G/DL (ref 31–37)
MCV RBC AUTO: 85 FL (ref 81–100)
MONOCYTES # BLD AUTO: 1.11 X10(3) UL (ref 0.1–1)
MONOCYTES NFR BLD AUTO: 5.8 %
NEUTROPHIL ABS PRELIM: 16.87 X10 (3) UL (ref 1.3–6.7)
NEUTROPHILS # BLD AUTO: 16.87 X10(3) UL (ref 1.3–6.7)
NEUTROPHILS NFR BLD AUTO: 87.6 %
NITRITE UR QL STRIP.AUTO: NEGATIVE
PH UR STRIP.AUTO: 5 [PH] (ref 4.5–8)
PLATELET # BLD AUTO: 273 10(3)UL (ref 150–450)
POTASSIUM SERPL-SCNC: 4 MMOL/L (ref 3.6–5.1)
PROT UR STRIP.AUTO-MCNC: 30 MG/DL
RBC # BLD AUTO: 4.14 X10(6)UL (ref 3.8–5.1)
RBC UR QL AUTO: NEGATIVE
RED CELL DISTRIBUTION WIDTH-SD: 42.5 FL (ref 35.1–46.3)
SODIUM SERPL-SCNC: 136 MMOL/L (ref 136–144)
SP GR UR STRIP.AUTO: 1.02 (ref 1–1.03)
UROBILINOGEN UR STRIP.AUTO-MCNC: <2 MG/DL
WBC # BLD AUTO: 19.2 X10(3) UL (ref 4–13)

## 2018-07-15 PROCEDURE — 96374 THER/PROPH/DIAG INJ IV PUSH: CPT

## 2018-07-15 PROCEDURE — 96361 HYDRATE IV INFUSION ADD-ON: CPT

## 2018-07-15 PROCEDURE — 80053 COMPREHEN METABOLIC PANEL: CPT | Performed by: EMERGENCY MEDICINE

## 2018-07-15 PROCEDURE — 74177 CT ABD & PELVIS W/CONTRAST: CPT | Performed by: EMERGENCY MEDICINE

## 2018-07-15 PROCEDURE — 85025 COMPLETE CBC W/AUTO DIFF WBC: CPT | Performed by: EMERGENCY MEDICINE

## 2018-07-15 PROCEDURE — 99284 EMERGENCY DEPT VISIT MOD MDM: CPT

## 2018-07-15 PROCEDURE — 81001 URINALYSIS AUTO W/SCOPE: CPT | Performed by: EMERGENCY MEDICINE

## 2018-07-15 RX ORDER — ACETAMINOPHEN 500 MG
1000 TABLET ORAL ONCE
Status: COMPLETED | OUTPATIENT
Start: 2018-07-15 | End: 2018-07-15

## 2018-07-15 RX ORDER — ONDANSETRON 4 MG/1
4 TABLET, ORALLY DISINTEGRATING ORAL EVERY 4 HOURS PRN
Qty: 10 TABLET | Refills: 0 | Status: SHIPPED | OUTPATIENT
Start: 2018-07-15 | End: 2018-07-22

## 2018-07-15 RX ORDER — ONDANSETRON 2 MG/ML
4 INJECTION INTRAMUSCULAR; INTRAVENOUS ONCE
Status: COMPLETED | OUTPATIENT
Start: 2018-07-15 | End: 2018-07-15

## 2018-07-15 RX ORDER — IBUPROFEN 600 MG/1
600 TABLET ORAL ONCE
Status: COMPLETED | OUTPATIENT
Start: 2018-07-15 | End: 2018-07-15

## 2018-07-15 NOTE — ED INITIAL ASSESSMENT (HPI)
Patient presents with nausea, vomiting, diarrhea, fever, and chills since Thursday. She also has lower abdominal pain.

## 2018-07-15 NOTE — ED PROVIDER NOTES
Patient Seen in: [facility] Emergency Department    History   Patient presents with:  Nausea/Vomiting/Diarrhea (gastrointestinal)  Fever (infectious)    Stated Complaint: fever, vomiting    HPI    22-year-old female comes the hospital complaint havi Procedure: COLONOSCOPY;  Surgeon: Joy Sibley MD;  Location: [facility] ENDOSCOPY  1996: CORRECT BUNION,OTHR METHODS Right  1996: CORRECT BUNION,OTHR METHODS Left  6/07: EGD SCOPE  1992: ERCP,DIAGNOSTIC  1991: HERNIA SURGERY      Comment: umb (Oral)   Resp 14   Ht 167.6 cm (5' 6\")   Wt 131.5 kg   SpO2 95%   BMI 46.81 kg/m²         Physical Exam    HEENT : NCAT, EOMI, PEERL, throat clear, neck supple, no JVD, trachea midline, No LAD  Heart: S1S2 normal. No murmurs, regular rate and rhythm  Lung AG, EIA     Ct Abdomen+pelvis(contrast Only)(cpt=74177)    Result Date: 7/15/2018  PROCEDURE:  CT ABDOMEN+PELVIS (CONTRAST ONLY) (CPT=74177)  COMPARISON:  [facility] , CT ABDOMEN+PELVIS(CPT=74176), 7/04/2016, 13:43.   INDICATIONS:  fever, vomiting  TECHNIQUE: colon wall thickening predominately involving the transverse colon. No inflammatory changes are seen adjacent to the thick-walled colon. The duodenum is mildly fluid filled and  distended. No evidence for small bowel obstruction.  ABDOMINAL WALL:  There (OMNIPAQUE) 350 MG/ML injection 100 mL (100 mL Intravenous Given 7/15/18 4524)     The patient was unable to have a bowel movement for collection at this particular time but is feeling better after the above.   Patient discharged home with outpatient manage

## 2018-07-17 ENCOUNTER — APPOINTMENT (OUTPATIENT)
Dept: LAB | Facility: HOSPITAL | Age: 63
End: 2018-07-17
Attending: EMERGENCY MEDICINE
Payer: COMMERCIAL

## 2018-07-17 LAB
CRYPTOSP AG STL QL IA: NEGATIVE
G LAMBLIA AG STL QL IA: NEGATIVE
ROTAVIRUS AG EIA: NEGATIVE

## 2018-07-17 PROCEDURE — 87425 ROTAVIRUS AG IA: CPT | Performed by: EMERGENCY MEDICINE

## 2018-07-17 PROCEDURE — 87046 STOOL CULTR AEROBIC BACT EA: CPT | Performed by: EMERGENCY MEDICINE

## 2018-07-17 PROCEDURE — 87077 CULTURE AEROBIC IDENTIFY: CPT | Performed by: EMERGENCY MEDICINE

## 2018-07-17 PROCEDURE — 87045 FECES CULTURE AEROBIC BACT: CPT | Performed by: EMERGENCY MEDICINE

## 2018-07-17 PROCEDURE — 87177 OVA AND PARASITES SMEARS: CPT | Performed by: EMERGENCY MEDICINE

## 2018-07-17 PROCEDURE — 87493 C DIFF AMPLIFIED PROBE: CPT | Performed by: EMERGENCY MEDICINE

## 2018-07-17 PROCEDURE — 87427 SHIGA-LIKE TOXIN AG IA: CPT | Performed by: EMERGENCY MEDICINE

## 2018-07-17 PROCEDURE — 82272 OCCULT BLD FECES 1-3 TESTS: CPT | Performed by: EMERGENCY MEDICINE

## 2018-07-17 PROCEDURE — 87329 GIARDIA AG IA: CPT | Performed by: EMERGENCY MEDICINE

## 2018-07-17 PROCEDURE — 87272 CRYPTOSPORIDIUM AG IF: CPT | Performed by: EMERGENCY MEDICINE

## 2018-07-17 PROCEDURE — 87209 SMEAR COMPLEX STAIN: CPT | Performed by: EMERGENCY MEDICINE

## 2018-07-20 LAB
OVA AND PARASITE, TRICHROME STAIN: NEGATIVE
OVA AND PARASITE, WET MOUNT: NEGATIVE

## 2018-08-31 PROCEDURE — 87040 BLOOD CULTURE FOR BACTERIA: CPT | Performed by: EMERGENCY MEDICINE

## 2018-09-05 PROBLEM — M72.2 PLANTAR FASCIITIS: Status: ACTIVE | Noted: 2018-09-05

## 2018-09-06 PROBLEM — M70.62 TROCHANTERIC BURSITIS OF BOTH HIPS: Status: ACTIVE | Noted: 2018-09-06

## 2018-09-06 PROBLEM — M70.61 TROCHANTERIC BURSITIS OF BOTH HIPS: Status: ACTIVE | Noted: 2018-09-06

## 2019-01-17 PROCEDURE — 82570 ASSAY OF URINE CREATININE: CPT | Performed by: INTERNAL MEDICINE

## 2019-01-17 PROCEDURE — 82043 UR ALBUMIN QUANTITATIVE: CPT | Performed by: INTERNAL MEDICINE

## 2019-08-26 ENCOUNTER — APPOINTMENT (OUTPATIENT)
Dept: GENERAL RADIOLOGY | Facility: HOSPITAL | Age: 64
End: 2019-08-26
Attending: EMERGENCY MEDICINE
Payer: COMMERCIAL

## 2019-08-26 ENCOUNTER — HOSPITAL ENCOUNTER (EMERGENCY)
Facility: HOSPITAL | Age: 64
Discharge: HOME | End: 2019-08-27
Attending: EMERGENCY MEDICINE
Payer: COMMERCIAL

## 2019-08-26 DIAGNOSIS — S20.211A CONTUSION OF RIGHT CHEST WALL, INITIAL ENCOUNTER: Primary | ICD-10-CM

## 2019-08-26 PROCEDURE — 99283 EMERGENCY DEPT VISIT LOW MDM: CPT

## 2019-08-26 PROCEDURE — 71101 X-RAY EXAM UNILAT RIBS/CHEST: CPT | Performed by: EMERGENCY MEDICINE

## 2019-08-26 RX ORDER — CYCLOBENZAPRINE HCL 10 MG
10 TABLET ORAL 3 TIMES DAILY PRN
Status: DISCONTINUED | OUTPATIENT
Start: 2019-08-26 | End: 2019-08-27

## 2019-08-26 RX ORDER — TRAMADOL HYDROCHLORIDE 50 MG/1
50 TABLET ORAL ONCE
Status: COMPLETED | OUTPATIENT
Start: 2019-08-26 | End: 2019-08-26

## 2019-08-27 VITALS
SYSTOLIC BLOOD PRESSURE: 135 MMHG | OXYGEN SATURATION: 98 % | RESPIRATION RATE: 14 BRPM | HEIGHT: 66 IN | WEIGHT: 280 LBS | TEMPERATURE: 97 F | DIASTOLIC BLOOD PRESSURE: 66 MMHG | HEART RATE: 88 BPM | BODY MASS INDEX: 45 KG/M2

## 2019-08-27 RX ORDER — CYCLOBENZAPRINE HCL 10 MG
10 TABLET ORAL 3 TIMES DAILY PRN
Qty: 20 TABLET | Refills: 0 | Status: SHIPPED | OUTPATIENT
Start: 2019-08-27 | End: 2019-08-30

## 2019-08-27 RX ORDER — TRAMADOL HYDROCHLORIDE 50 MG/1
TABLET ORAL EVERY 6 HOURS PRN
Qty: 10 TABLET | Refills: 0 | Status: SHIPPED | OUTPATIENT
Start: 2019-08-27 | End: 2019-08-30

## 2019-08-27 NOTE — ED PROVIDER NOTES
Patient Seen in: [facility] Emergency Department    History   Patient presents with:  Fall (musculoskeletal, neurologic)  Trauma (cardiovascular, musculoskeletal)    Stated Complaint: right rib pain/fall    HPI    82-year-old woman presents to the e MD JOVI at [address] [place] METHODS Left 1996   • EGD SCOPE  6/07   • ERCP,DIAGNOSTIC  1992   • HERNIA SURGERY  3443    umbilical hernia    • HERNIA SURGERY  12/1992    abdominal hernia x 7   • 5/2015   • TOTAL KNEE REPLACEMENT Right 7/2015   • UPPER GI ENDOSCOPY,EXAM  1992                    Social History    Tobacco Use      Smoking status: Current Some Day Smoker        Packs/day: 0.01        Years: 25.00        Pack years: .25        Types: C having right side rib pain just under right     breast.               FINDINGS:      LUNGS:  No significant pulmonary parenchymal abnormalities and normal     vascularity. CARDIAC:  Normal size cardiac silhouette.     MEDIASTINUM:  Normal.    PLEURA:  No

## 2019-08-27 NOTE — ED INITIAL ASSESSMENT (HPI)
Pt presents to ed c/o sharp right rib pain at a 10/10 that is increased with inspiration [place] airport [place] at approx 1830. Pt denies head or neck pain or injury. Pt denies dizziness, states her knee buckled causing the fall.  Pt is

## 2019-11-07 PROBLEM — M54.16 LUMBAR RADICULITIS: Status: ACTIVE | Noted: 2019-11-07

## 2019-11-07 PROBLEM — M46.1 SACROILIITIS (HCC): Status: ACTIVE | Noted: 2019-11-07

## 2020-01-23 PROBLEM — J42 ACUTE EXACERBATION OF CHRONIC BRONCHITIS (HCC): Status: ACTIVE | Noted: 2020-01-23

## 2020-01-23 PROBLEM — M25.561 CHRONIC PAIN OF RIGHT KNEE: Status: RESOLVED | Noted: 2017-05-26 | Resolved: 2020-01-23

## 2020-01-23 PROBLEM — G89.29 CHRONIC PAIN OF RIGHT KNEE: Status: RESOLVED | Noted: 2017-05-26 | Resolved: 2020-01-23

## 2020-01-23 PROBLEM — D82.4 HYPER-IGE SYNDROME (HCC): Status: ACTIVE | Noted: 2020-01-23

## 2020-01-23 PROBLEM — J20.9 ACUTE EXACERBATION OF CHRONIC BRONCHITIS (HCC): Status: ACTIVE | Noted: 2020-01-23

## 2020-02-02 PROBLEM — J42 ACUTE EXACERBATION OF CHRONIC BRONCHITIS (HCC): Status: RESOLVED | Noted: 2020-01-23 | Resolved: 2020-02-02

## 2020-02-02 PROBLEM — J20.9 ACUTE EXACERBATION OF CHRONIC BRONCHITIS (HCC): Status: RESOLVED | Noted: 2020-01-23 | Resolved: 2020-02-02

## 2020-03-16 ENCOUNTER — HOSPITAL ENCOUNTER (EMERGENCY)
Facility: HOSPITAL | Age: 65
Discharge: HOME | End: 2020-03-16
Attending: EMERGENCY MEDICINE
Payer: COMMERCIAL

## 2020-03-16 ENCOUNTER — APPOINTMENT (OUTPATIENT)
Dept: GENERAL RADIOLOGY | Facility: HOSPITAL | Age: 65
End: 2020-03-16
Attending: EMERGENCY MEDICINE
Payer: COMMERCIAL

## 2020-03-16 VITALS
RESPIRATION RATE: 18 BRPM | TEMPERATURE: 98 F | OXYGEN SATURATION: 100 % | HEART RATE: 62 BPM | WEIGHT: 275 LBS | DIASTOLIC BLOOD PRESSURE: 52 MMHG | BODY MASS INDEX: 44.2 KG/M2 | HEIGHT: 66 IN | SYSTOLIC BLOOD PRESSURE: 96 MMHG

## 2020-03-16 DIAGNOSIS — S20.211A CONTUSION OF RIB ON RIGHT SIDE, INITIAL ENCOUNTER: Primary | ICD-10-CM

## 2020-03-16 PROCEDURE — 99283 EMERGENCY DEPT VISIT LOW MDM: CPT | Performed by: EMERGENCY MEDICINE

## 2020-03-16 PROCEDURE — 71101 X-RAY EXAM UNILAT RIBS/CHEST: CPT | Performed by: EMERGENCY MEDICINE

## 2020-03-16 RX ORDER — TRAMADOL HYDROCHLORIDE 50 MG/1
TABLET ORAL EVERY 6 HOURS PRN
Qty: 10 TABLET | Refills: 0 | Status: SHIPPED | OUTPATIENT
Start: 2020-03-16 | End: 2020-03-21

## 2020-03-16 RX ORDER — HYDROCODONE BITARTRATE AND ACETAMINOPHEN 5; 325 MG/1; MG/1
2 TABLET ORAL ONCE
Status: DISCONTINUED | OUTPATIENT
Start: 2020-03-16 | End: 2020-03-16

## 2020-03-16 RX ORDER — LIDOCAINE 50 MG/G
1 PATCH TOPICAL EVERY 24 HOURS
Qty: 6 PATCH | Refills: 0 | Status: SHIPPED | OUTPATIENT
Start: 2020-03-16 | End: 2020-03-20

## 2020-03-16 RX ORDER — TRAMADOL HYDROCHLORIDE 50 MG/1
50 TABLET ORAL ONCE
Status: COMPLETED | OUTPATIENT
Start: 2020-03-16 | End: 2020-03-16

## 2020-03-16 NOTE — ED INITIAL ASSESSMENT (HPI)
Patient ambulatory to ED, reports she was walking her dog on Saturday, her knee gave out, she landed on her right side. + right sided rib pain, hard to take a deep breath, denies SOB. Denies hitting head or LOC.

## 2020-03-18 NOTE — ED PROVIDER NOTES
Patient Seen in: [facility] Emergency Department      History   Patient presents with:  Trauma    Stated Complaint: Right rib pain post fall Saturday, ambulatory. HPI    71-year-old woman presents to the emergency department with right rib pain. Performed by Wesley Schwartz MD at [address]   •      •      • CARPAL TUNNEL RELEASE Left    • CEA, PANCREATIC CYST FLUID      had pancreatic cyst surgery   • CHOLECYSTECTOMY     • COLONOSCOPY  2 9/4/2018    Performed by Alejandra Ashraf MD at [address]   • [address] RF Right 5/30/2018    Performed by Cecil Cohen MD at [facility], HAMMER TOE Right 1998   • SPLINT, HAMMER TOE Left 1998   • SURGER noted.  Her heart has a regular rate and rhythm without murmurs rubs or gallops her abdomen is obese but soft nontender nondistended. Her upper and lower extremities are benign she is moving all 4 extremities they are atraumatic to the touch.   She is awak written for. Lidoderm patches given. Stable for discharge home. All questions have been answered.           Disposition and Plan     Clinical Impression:  Contusion of rib on right side, initial encounter  (primary encounter diagnosis)    Disposition:  D

## 2020-07-20 PROBLEM — E11.69 HYPERLIPIDEMIA ASSOCIATED WITH TYPE 2 DIABETES MELLITUS (HCC): Status: ACTIVE | Noted: 2020-07-20

## 2020-07-20 PROBLEM — E11.69 HYPERLIPIDEMIA ASSOCIATED WITH TYPE 2 DIABETES MELLITUS: Status: ACTIVE | Noted: 2020-07-20

## 2020-07-20 PROBLEM — E78.5 HYPERLIPIDEMIA ASSOCIATED WITH TYPE 2 DIABETES MELLITUS  (HCC): Status: ACTIVE | Noted: 2020-07-20

## 2020-07-20 PROBLEM — Z79.4 TYPE 2 DIABETES MELLITUS WITH MODERATE NONPROLIFERATIVE RETINOPATHY WITHOUT MACULAR EDEMA, WITH LONG-TERM CURRENT USE OF INSULIN, UNSPECIFIED LATERALITY (HCC): Status: ACTIVE | Noted: 2020-07-20

## 2020-07-20 PROBLEM — E11.65 TYPE 2 DIABETES MELLITUS WITH HYPERGLYCEMIA, WITH LONG-TERM CURRENT USE OF INSULIN (HCC): Status: ACTIVE | Noted: 2020-07-20

## 2020-07-20 PROBLEM — Z79.4 TYPE 2 DIABETES MELLITUS WITH HYPERGLYCEMIA, WITH LONG-TERM CURRENT USE OF INSULIN (HCC): Status: ACTIVE | Noted: 2020-07-20

## 2020-07-20 PROBLEM — E11.69 HYPERLIPIDEMIA ASSOCIATED WITH TYPE 2 DIABETES MELLITUS  (HCC): Status: ACTIVE | Noted: 2020-07-20

## 2020-07-20 PROBLEM — E78.5 HYPERLIPIDEMIA ASSOCIATED WITH TYPE 2 DIABETES MELLITUS: Status: ACTIVE | Noted: 2020-07-20

## 2020-07-20 PROBLEM — E11.3399 TYPE 2 DIABETES MELLITUS WITH MODERATE NONPROLIFERATIVE RETINOPATHY WITHOUT MACULAR EDEMA, WITH LONG-TERM CURRENT USE OF INSULIN, UNSPECIFIED LATERALITY (HCC): Status: ACTIVE | Noted: 2020-07-20

## 2020-07-20 PROBLEM — E78.5 HYPERLIPIDEMIA ASSOCIATED WITH TYPE 2 DIABETES MELLITUS (HCC): Status: ACTIVE | Noted: 2020-07-20

## 2020-09-03 PROBLEM — M75.101 ROTATOR CUFF SYNDROME OF RIGHT SHOULDER: Status: ACTIVE | Noted: 2020-09-03

## 2020-10-19 ENCOUNTER — LAB ENCOUNTER (OUTPATIENT)
Dept: LAB | Facility: HOSPITAL | Age: 65
End: 2020-10-19
Attending: STUDENT IN AN ORGANIZED HEALTH CARE EDUCATION/TRAINING PROGRAM
Payer: MEDICARE

## 2020-10-19 DIAGNOSIS — R19.7 DIARRHEA, UNSPECIFIED TYPE: ICD-10-CM

## 2020-10-19 PROCEDURE — 87493 C DIFF AMPLIFIED PROBE: CPT

## 2020-10-19 PROCEDURE — 87045 FECES CULTURE AEROBIC BACT: CPT

## 2020-10-19 PROCEDURE — 87046 STOOL CULTR AEROBIC BACT EA: CPT

## 2020-10-19 PROCEDURE — 87077 CULTURE AEROBIC IDENTIFY: CPT

## 2020-10-27 PROBLEM — A04.72 C. DIFFICILE DIARRHEA: Status: ACTIVE | Noted: 2020-10-27

## 2020-10-29 ENCOUNTER — LAB ENCOUNTER (OUTPATIENT)
Dept: LAB | Facility: HOSPITAL | Age: 65
End: 2020-10-29
Attending: INTERNAL MEDICINE
Payer: MEDICARE

## 2020-10-29 DIAGNOSIS — R19.7 DIARRHEA OF PRESUMED INFECTIOUS ORIGIN: ICD-10-CM

## 2020-10-29 PROCEDURE — 87493 C DIFF AMPLIFIED PROBE: CPT

## 2020-10-30 NOTE — PROGRESS NOTES
10/30/2020  [address] [facility] [address]    Dear Autumn Bhat,       Here are your results from your recent visit with Gastroenterology. C Diff is negative. Tried to call you at 3:30pm Friday but your cell phone would not ring.     If y

## 2020-12-07 PROBLEM — D72.19 OTHER EOSINOPHILIA: Status: ACTIVE | Noted: 2020-10-17

## 2021-01-19 ENCOUNTER — LAB ENCOUNTER (OUTPATIENT)
Dept: LAB | Facility: HOSPITAL | Age: 66
End: 2021-01-19
Attending: INTERNAL MEDICINE
Payer: MEDICARE

## 2021-01-19 DIAGNOSIS — A04.72 C. DIFFICILE DIARRHEA: ICD-10-CM

## 2021-01-19 DIAGNOSIS — K58.0 IRRITABLE BOWEL SYNDROME WITH DIARRHEA: ICD-10-CM

## 2021-01-19 PROCEDURE — 87493 C DIFF AMPLIFIED PROBE: CPT

## 2021-01-20 LAB — C DIFF TOX B STL QL: NEGATIVE

## 2021-01-20 NOTE — PROGRESS NOTES
1/20/2021  [address] [facility] [address]    Dear Virginia Fisher,       Here are your results from your recent visit with Gastroenterology. Your stool test was negative for C Diff. Let us know how you are doing.     If you need any further

## 2021-05-26 ENCOUNTER — LAB ENCOUNTER (OUTPATIENT)
Dept: LAB | Facility: HOSPITAL | Age: 66
End: 2021-05-26
Attending: INTERNAL MEDICINE
Payer: MEDICARE

## 2021-05-26 DIAGNOSIS — R19.7 DIARRHEA, UNSPECIFIED TYPE: ICD-10-CM

## 2021-05-26 PROCEDURE — 83993 ASSAY FOR CALPROTECTIN FECAL: CPT

## 2021-05-26 PROCEDURE — 87493 C DIFF AMPLIFIED PROBE: CPT

## 2021-05-27 NOTE — PROGRESS NOTES
5/27/2021  [facility] [address] [address]    Dear Roberto Porter,       Here are your results from your recent visit with Gastroenterology. C. difficile was negative. The calprotectin was minimally elevated raising the possibility of some in

## 2021-06-21 PROBLEM — A04.72 C. DIFFICILE DIARRHEA: Status: RESOLVED | Noted: 2020-10-27 | Resolved: 2021-06-21

## 2021-07-04 ENCOUNTER — HOSPITAL ENCOUNTER (EMERGENCY)
Facility: HOSPITAL | Age: 66
Discharge: HOME | End: 2021-07-04
Attending: EMERGENCY MEDICINE
Payer: MEDICARE

## 2021-07-04 ENCOUNTER — APPOINTMENT (OUTPATIENT)
Dept: ULTRASOUND IMAGING | Facility: HOSPITAL | Age: 66
End: 2021-07-04
Attending: EMERGENCY MEDICINE
Payer: MEDICARE

## 2021-07-04 VITALS
OXYGEN SATURATION: 97 % | HEIGHT: 66 IN | DIASTOLIC BLOOD PRESSURE: 108 MMHG | HEART RATE: 101 BPM | BODY MASS INDEX: 43.87 KG/M2 | RESPIRATION RATE: 16 BRPM | TEMPERATURE: 97 F | WEIGHT: 273 LBS | SYSTOLIC BLOOD PRESSURE: 134 MMHG

## 2021-07-04 DIAGNOSIS — R22.42 LOCALIZED SWELLING OF LEFT LOWER EXTREMITY: Primary | ICD-10-CM

## 2021-07-04 PROCEDURE — 99284 EMERGENCY DEPT VISIT MOD MDM: CPT

## 2021-07-04 PROCEDURE — 93971 EXTREMITY STUDY: CPT | Performed by: EMERGENCY MEDICINE

## 2021-07-04 NOTE — ED PROVIDER NOTES
Patient Seen in: [facility] Emergency Department      History   Patient presents with:  Deep Vein Thrombosis    Stated Complaint: left leg pain, behind knee since yesterday. also swelling.     HPI/Subjective:   HPI    71-year-old female who presents CORRECT BUNION,OTHR METHODS Right 1996   • CORRECT BUNION,OTHR METHODS Left 1996   • EGD SCOPE  6/07   • ERCP,DIAGNOSTIC  1992   • HERNIA SURGERY  4810    umbilical hernia    • HERNIA SURGERY  12/1992    abdominal hernia x 7   • KNEE REPLACEMENT SURGERY (36.3 °C) (Temporal)   Resp 16   Ht 167.6 cm (5' 6\")   Wt 123.8 kg   LMP 01/01/2009   SpO2 97%   BMI 44.06 kg/m²         Physical Exam  General: 71-year-old female who presents to the emergency department with lower extremity discomfort.   Appears to be no  Negative for DVT.             Dictated by (CST): Zulma Rosas MD on 7/04/2021 at [address] by (CST): Zulma Rosas MD on 7/04/2021 at 4:13 PM                 MDM      Sent for an ultrasound of the left lower extremity. To rule out DVT.

## 2021-07-22 ENCOUNTER — HOSPITAL ENCOUNTER (EMERGENCY)
Facility: HOSPITAL | Age: 66
Discharge: HOME | End: 2021-07-23
Attending: EMERGENCY MEDICINE
Payer: MEDICARE

## 2021-07-22 DIAGNOSIS — U07.1 COVID-19: Primary | ICD-10-CM

## 2021-07-22 LAB — AMB EXT COVID-19 RESULT: DETECTED

## 2021-07-22 PROCEDURE — 99284 EMERGENCY DEPT VISIT MOD MDM: CPT

## 2021-07-22 PROCEDURE — 99453 REM MNTR PHYSIOL PARAM SETUP: CPT

## 2021-07-23 VITALS
RESPIRATION RATE: 16 BRPM | BODY MASS INDEX: 43.9 KG/M2 | DIASTOLIC BLOOD PRESSURE: 79 MMHG | HEART RATE: 105 BPM | OXYGEN SATURATION: 98 % | WEIGHT: 273.13 LBS | SYSTOLIC BLOOD PRESSURE: 130 MMHG | TEMPERATURE: 99 F | HEIGHT: 66 IN

## 2021-07-23 RX ORDER — SODIUM CHLORIDE 9 MG/ML
1000 INJECTION, SOLUTION INTRAVENOUS ONCE
Status: COMPLETED | OUTPATIENT
Start: 2021-07-23 | End: 2021-07-23

## 2021-07-23 RX ORDER — ACETAMINOPHEN 500 MG
1000 TABLET ORAL ONCE
Status: COMPLETED | OUTPATIENT
Start: 2021-07-23 | End: 2021-07-23

## 2021-07-23 NOTE — ED PROVIDER NOTES
Patient Seen in: [facility] Emergency Department      History   Patient presents with:  Covid    Stated Complaint: covid symptoms since saturday, here for an infusion      HPI/Subjective:   HPI    80-year-old female who is fully vaccinated for Covid hernia x 7   • KNEE REPLACEMENT SURGERY     • OTHER SURGICAL HISTORY  12/03/2014    cysto Dr. Keshia Santos   • Sasha Gall    procedure to remove cyst from pancreas, developed abscess and required 2nd procedure   • OTHER SURGICAL HISTORY Left 1996 HENT:      Head: Normocephalic and atraumatic. Eyes:      Conjunctiva/sclera: Conjunctivae normal.      Pupils: Pupils are equal, round, and reactive to light. Cardiovascular:      Rate and Rhythm: Normal rate and regular rhythm.       Heart sounds: N under this Emergency Use Authorization. The patient has agreed and will receive the infusion and be monitored for 60 minutes after.    They have been advised that they should continue to self-isolate and use infection control measures (e.g., wear mask, isol

## 2021-08-17 ENCOUNTER — APPOINTMENT (OUTPATIENT)
Dept: GENERAL RADIOLOGY | Facility: HOSPITAL | Age: 66
End: 2021-08-17
Attending: EMERGENCY MEDICINE
Payer: MEDICARE

## 2021-08-17 ENCOUNTER — HOSPITAL ENCOUNTER (OUTPATIENT)
Facility: HOSPITAL | Age: 66
Setting detail: OBSERVATION
LOS: 1 days | Discharge: HOME | End: 2021-08-18
Attending: EMERGENCY MEDICINE | Admitting: INTERNAL MEDICINE
Payer: MEDICARE

## 2021-08-17 DIAGNOSIS — R07.9 CHEST PAIN OF UNCERTAIN ETIOLOGY: Primary | ICD-10-CM

## 2021-08-17 DIAGNOSIS — I48.92 ATRIAL FLUTTER, UNSPECIFIED TYPE (HCC): ICD-10-CM

## 2021-08-17 LAB
ALBUMIN SERPL-MCNC: 3.7 G/DL (ref 3.4–5)
ALBUMIN/GLOB SERPL: 0.9 {RATIO} (ref 1–2)
ALP LIVER SERPL-CCNC: 110 U/L
ALT SERPL-CCNC: 18 U/L
ANION GAP SERPL CALC-SCNC: 5 MMOL/L (ref 0–18)
APTT PPP: 32.7 SECONDS (ref 25.4–36.1)
AST SERPL-CCNC: 15 U/L (ref 15–37)
BASOPHILS # BLD AUTO: 0.05 X10(3) UL (ref 0–0.2)
BASOPHILS NFR BLD AUTO: 0.4 %
BILIRUB SERPL-MCNC: 0.4 MG/DL (ref 0.1–2)
BUN BLD-MCNC: 27 MG/DL (ref 7–18)
CALCIUM BLD-MCNC: 9.3 MG/DL (ref 8.5–10.1)
CHLORIDE SERPL-SCNC: 102 MMOL/L (ref 98–112)
CO2 SERPL-SCNC: 31 MMOL/L (ref 21–32)
CREAT BLD-MCNC: 1.22 MG/DL
D-DIMER: 0.27 UG/ML FEU (ref ?–0.64)
EOSINOPHIL # BLD AUTO: 0.49 X10(3) UL (ref 0–0.7)
EOSINOPHIL NFR BLD AUTO: 4.2 %
ERYTHROCYTE [DISTWIDTH] IN BLOOD BY AUTOMATED COUNT: 14.8 %
GLOBULIN PLAS-MCNC: 4.1 G/DL (ref 2.8–4.4)
GLUCOSE BLD-MCNC: 124 MG/DL (ref 70–99)
GLUCOSE BLD-MCNC: 73 MG/DL (ref 70–99)
HCT VFR BLD AUTO: 42.5 %
HGB BLD-MCNC: 13.9 G/DL
IMM GRANULOCYTES # BLD AUTO: 0.02 X10(3) UL (ref 0–1)
IMM GRANULOCYTES NFR BLD: 0.2 %
LYMPHOCYTES # BLD AUTO: 3.46 X10(3) UL (ref 1–4)
LYMPHOCYTES NFR BLD AUTO: 29.8 %
M PROTEIN MFR SERPL ELPH: 7.8 G/DL (ref 6.4–8.2)
MCH RBC QN AUTO: 27.3 PG (ref 26–34)
MCHC RBC AUTO-ENTMCNC: 32.7 G/DL (ref 31–37)
MCV RBC AUTO: 83.3 FL
MONOCYTES # BLD AUTO: 0.67 X10(3) UL (ref 0.1–1)
MONOCYTES NFR BLD AUTO: 5.8 %
NEUTROPHILS # BLD AUTO: 6.93 X10 (3) UL (ref 1.5–7.7)
NEUTROPHILS # BLD AUTO: 6.93 X10(3) UL (ref 1.5–7.7)
NEUTROPHILS NFR BLD AUTO: 59.6 %
OSMOLALITY SERPL CALC.SUM OF ELEC: 290 MOSM/KG (ref 275–295)
PLATELET # BLD AUTO: 297 10(3)UL (ref 150–450)
POTASSIUM SERPL-SCNC: 2.8 MMOL/L (ref 3.5–5.1)
RBC # BLD AUTO: 5.1 X10(6)UL
SARS-COV-2 RNA RESP QL NAA+PROBE: NOT DETECTED
SODIUM SERPL-SCNC: 138 MMOL/L (ref 136–145)
TROPONIN I SERPL-MCNC: <0.045 NG/ML (ref ?–0.04)
WBC # BLD AUTO: 11.6 X10(3) UL (ref 4–11)

## 2021-08-17 PROCEDURE — 82962 GLUCOSE BLOOD TEST: CPT

## 2021-08-17 PROCEDURE — 85025 COMPLETE CBC W/AUTO DIFF WBC: CPT | Performed by: EMERGENCY MEDICINE

## 2021-08-17 PROCEDURE — 93010 ELECTROCARDIOGRAM REPORT: CPT

## 2021-08-17 PROCEDURE — 93005 ELECTROCARDIOGRAM TRACING: CPT

## 2021-08-17 PROCEDURE — 96365 THER/PROPH/DIAG IV INF INIT: CPT

## 2021-08-17 PROCEDURE — 80053 COMPREHEN METABOLIC PANEL: CPT | Performed by: EMERGENCY MEDICINE

## 2021-08-17 PROCEDURE — 99285 EMERGENCY DEPT VISIT HI MDM: CPT

## 2021-08-17 PROCEDURE — 85730 THROMBOPLASTIN TIME PARTIAL: CPT | Performed by: EMERGENCY MEDICINE

## 2021-08-17 PROCEDURE — 71045 X-RAY EXAM CHEST 1 VIEW: CPT | Performed by: EMERGENCY MEDICINE

## 2021-08-17 PROCEDURE — 85379 FIBRIN DEGRADATION QUANT: CPT | Performed by: EMERGENCY MEDICINE

## 2021-08-17 PROCEDURE — 96366 THER/PROPH/DIAG IV INF ADDON: CPT

## 2021-08-17 PROCEDURE — 84484 ASSAY OF TROPONIN QUANT: CPT | Performed by: EMERGENCY MEDICINE

## 2021-08-17 RX ORDER — HEPARIN SODIUM 5000 [USP'U]/ML
5000 INJECTION INTRAVENOUS; SUBCUTANEOUS ONCE
Status: COMPLETED | OUTPATIENT
Start: 2021-08-17 | End: 2021-08-17

## 2021-08-17 RX ORDER — POTASSIUM CHLORIDE 20 MEQ/1
40 TABLET, EXTENDED RELEASE ORAL ONCE
Status: COMPLETED | OUTPATIENT
Start: 2021-08-17 | End: 2021-08-17

## 2021-08-17 RX ORDER — TORSEMIDE 5 MG/1
5 TABLET ORAL DAILY
Status: DISCONTINUED | OUTPATIENT
Start: 2021-08-17 | End: 2021-08-18

## 2021-08-17 RX ORDER — BISACODYL 10 MG
10 SUPPOSITORY, RECTAL RECTAL
Status: DISCONTINUED | OUTPATIENT
Start: 2021-08-17 | End: 2021-08-18

## 2021-08-17 RX ORDER — ASPIRIN 81 MG/1
324 TABLET, CHEWABLE ORAL ONCE
Status: COMPLETED | OUTPATIENT
Start: 2021-08-17 | End: 2021-08-17

## 2021-08-17 RX ORDER — BUPROPION HYDROCHLORIDE 150 MG/1
150 TABLET ORAL DAILY
Status: DISCONTINUED | OUTPATIENT
Start: 2021-08-17 | End: 2021-08-18

## 2021-08-17 RX ORDER — DEXTROSE MONOHYDRATE 25 G/50ML
50 INJECTION, SOLUTION INTRAVENOUS
Status: DISCONTINUED | OUTPATIENT
Start: 2021-08-17 | End: 2021-08-18

## 2021-08-17 RX ORDER — HEPARIN SODIUM AND DEXTROSE 10000; 5 [USP'U]/100ML; G/100ML
1000 INJECTION INTRAVENOUS ONCE
Status: COMPLETED | OUTPATIENT
Start: 2021-08-17 | End: 2021-08-18

## 2021-08-17 RX ORDER — ACETAMINOPHEN 325 MG/1
650 TABLET ORAL EVERY 6 HOURS PRN
Status: DISCONTINUED | OUTPATIENT
Start: 2021-08-17 | End: 2021-08-18

## 2021-08-17 RX ORDER — ATORVASTATIN CALCIUM 20 MG/1
20 TABLET, FILM COATED ORAL DAILY
Status: DISCONTINUED | OUTPATIENT
Start: 2021-08-17 | End: 2021-08-18

## 2021-08-17 RX ORDER — POLYETHYLENE GLYCOL 3350 17 G/17G
17 POWDER, FOR SOLUTION ORAL DAILY PRN
Status: DISCONTINUED | OUTPATIENT
Start: 2021-08-17 | End: 2021-08-18

## 2021-08-17 RX ORDER — SODIUM PHOSPHATE, DIBASIC AND SODIUM PHOSPHATE, MONOBASIC 7; 19 G/133ML; G/133ML
1 ENEMA RECTAL ONCE AS NEEDED
Status: DISCONTINUED | OUTPATIENT
Start: 2021-08-17 | End: 2021-08-18

## 2021-08-17 RX ORDER — HEPARIN SODIUM AND DEXTROSE 10000; 5 [USP'U]/100ML; G/100ML
INJECTION INTRAVENOUS CONTINUOUS
Status: DISCONTINUED | OUTPATIENT
Start: 2021-08-18 | End: 2021-08-18

## 2021-08-17 RX ORDER — ASPIRIN 81 MG/1
81 TABLET ORAL DAILY
Status: DISCONTINUED | OUTPATIENT
Start: 2021-08-17 | End: 2021-08-18

## 2021-08-17 RX ORDER — ALPRAZOLAM 0.5 MG/1
0.5 TABLET ORAL EVERY 6 HOURS PRN
Status: DISCONTINUED | OUTPATIENT
Start: 2021-08-17 | End: 2021-08-18

## 2021-08-17 RX ORDER — GABAPENTIN 300 MG/1
300 CAPSULE ORAL 3 TIMES DAILY
Status: DISCONTINUED | OUTPATIENT
Start: 2021-08-17 | End: 2021-08-18

## 2021-08-17 RX ORDER — NORTRIPTYLINE HYDROCHLORIDE 25 MG/1
25 CAPSULE ORAL EVERY EVENING
Status: DISCONTINUED | OUTPATIENT
Start: 2021-08-17 | End: 2021-08-18

## 2021-08-17 RX ORDER — ONDANSETRON 2 MG/ML
4 INJECTION INTRAMUSCULAR; INTRAVENOUS EVERY 6 HOURS PRN
Status: DISCONTINUED | OUTPATIENT
Start: 2021-08-17 | End: 2021-08-18

## 2021-08-17 RX ORDER — PROCHLORPERAZINE EDISYLATE 5 MG/ML
5 INJECTION INTRAMUSCULAR; INTRAVENOUS EVERY 8 HOURS PRN
Status: DISCONTINUED | OUTPATIENT
Start: 2021-08-17 | End: 2021-08-18

## 2021-08-17 NOTE — ED INITIAL ASSESSMENT (HPI)
Pt rpeorts having pain on inspiration for past few days, speaking in full sentences.  Pt reports occasional dizziness, no syncope, had bp reading of systolic 598 at chiropractor yesterday

## 2021-08-18 ENCOUNTER — APPOINTMENT (OUTPATIENT)
Dept: CV DIAGNOSTICS | Facility: HOSPITAL | Age: 66
End: 2021-08-18
Attending: INTERNAL MEDICINE
Payer: MEDICARE

## 2021-08-18 VITALS
HEART RATE: 108 BPM | HEIGHT: 66 IN | SYSTOLIC BLOOD PRESSURE: 118 MMHG | DIASTOLIC BLOOD PRESSURE: 66 MMHG | TEMPERATURE: 98 F | BODY MASS INDEX: 44.15 KG/M2 | RESPIRATION RATE: 18 BRPM | WEIGHT: 274.69 LBS | OXYGEN SATURATION: 97 %

## 2021-08-18 LAB
ANION GAP SERPL CALC-SCNC: 3 MMOL/L (ref 0–18)
APTT PPP: 59.1 SECONDS (ref 25.4–36.1)
APTT PPP: 66.3 SECONDS (ref 25.4–36.1)
ATRIAL RATE: 256 BPM
BUN BLD-MCNC: 25 MG/DL (ref 7–18)
CALCIUM BLD-MCNC: 8.9 MG/DL (ref 8.5–10.1)
CHLORIDE SERPL-SCNC: 107 MMOL/L (ref 98–112)
CO2 SERPL-SCNC: 29 MMOL/L (ref 21–32)
CREAT BLD-MCNC: 1.03 MG/DL
GLUCOSE BLD-MCNC: 108 MG/DL (ref 70–99)
GLUCOSE BLD-MCNC: 114 MG/DL (ref 70–99)
GLUCOSE BLD-MCNC: 119 MG/DL (ref 70–99)
GLUCOSE BLD-MCNC: 61 MG/DL (ref 70–99)
GLUCOSE BLD-MCNC: 92 MG/DL (ref 70–99)
OSMOLALITY SERPL CALC.SUM OF ELEC: 293 MOSM/KG (ref 275–295)
P AXIS: -83 DEGREES
POTASSIUM SERPL-SCNC: 3.4 MMOL/L (ref 3.5–5.1)
Q-T INTERVAL: 374 MS
QRS DURATION: 106 MS
QTC CALCULATION (BEZET): 462 MS
R AXIS: 9 DEGREES
SODIUM SERPL-SCNC: 139 MMOL/L (ref 136–145)
T AXIS: -42 DEGREES
TROPONIN I SERPL-MCNC: <0.045 NG/ML (ref ?–0.04)
TROPONIN I SERPL-MCNC: <0.045 NG/ML (ref ?–0.04)
VENTRICULAR RATE: 92 BPM

## 2021-08-18 PROCEDURE — 84132 ASSAY OF SERUM POTASSIUM: CPT | Performed by: INTERNAL MEDICINE

## 2021-08-18 PROCEDURE — 80048 BASIC METABOLIC PNL TOTAL CA: CPT | Performed by: INTERNAL MEDICINE

## 2021-08-18 PROCEDURE — 93018 CV STRESS TEST I&R ONLY: CPT | Performed by: INTERNAL MEDICINE

## 2021-08-18 PROCEDURE — 78452 HT MUSCLE IMAGE SPECT MULT: CPT | Performed by: INTERNAL MEDICINE

## 2021-08-18 PROCEDURE — 82962 GLUCOSE BLOOD TEST: CPT

## 2021-08-18 PROCEDURE — 85730 THROMBOPLASTIN TIME PARTIAL: CPT | Performed by: INTERNAL MEDICINE

## 2021-08-18 PROCEDURE — 85730 THROMBOPLASTIN TIME PARTIAL: CPT | Performed by: EMERGENCY MEDICINE

## 2021-08-18 PROCEDURE — B246ZZZ ULTRASONOGRAPHY OF RIGHT AND LEFT HEART: ICD-10-PCS | Performed by: INTERNAL MEDICINE

## 2021-08-18 PROCEDURE — 93017 CV STRESS TEST TRACING ONLY: CPT | Performed by: INTERNAL MEDICINE

## 2021-08-18 PROCEDURE — 84484 ASSAY OF TROPONIN QUANT: CPT | Performed by: INTERNAL MEDICINE

## 2021-08-18 PROCEDURE — 96366 THER/PROPH/DIAG IV INF ADDON: CPT

## 2021-08-18 PROCEDURE — 93306 TTE W/DOPPLER COMPLETE: CPT | Performed by: INTERNAL MEDICINE

## 2021-08-18 RX ORDER — POTASSIUM CHLORIDE 20 MEQ/1
40 TABLET, EXTENDED RELEASE ORAL EVERY 4 HOURS
Status: COMPLETED | OUTPATIENT
Start: 2021-08-18 | End: 2021-08-18

## 2021-08-18 RX ORDER — METOPROLOL SUCCINATE 25 MG/1
25 TABLET, EXTENDED RELEASE ORAL
Qty: 30 TABLET | Refills: 0 | Status: SHIPPED | OUTPATIENT
Start: 2021-08-19 | End: 2021-09-15

## 2021-08-18 RX ORDER — DIAPER,BRIEF,INFANT-TODD,DISP
EACH MISCELLANEOUS 2 TIMES DAILY PRN
Status: DISCONTINUED | OUTPATIENT
Start: 2021-08-18 | End: 2021-08-18

## 2021-08-18 RX ORDER — POTASSIUM CHLORIDE 20 MEQ/1
40 TABLET, EXTENDED RELEASE ORAL DAILY
Status: DISCONTINUED | OUTPATIENT
Start: 2021-08-18 | End: 2021-08-18

## 2021-08-18 RX ORDER — METOPROLOL SUCCINATE 25 MG/1
25 TABLET, EXTENDED RELEASE ORAL
Status: DISCONTINUED | OUTPATIENT
Start: 2021-08-18 | End: 2021-08-18

## 2021-08-18 NOTE — PLAN OF CARE
Preliminary nuclear stress test results as called to me by Dr. Janine Turk:    Negative for perfusion abnormalities and LVEF 52%. Stable CV status for DC.      RAFAT Florez

## 2021-08-18 NOTE — PROGRESS NOTES
CARDIODIAGNOSTIC PRELIMINARY REPORT:    Doreen Mckeon completed, tolerated well    Second set of images pending

## 2021-08-18 NOTE — PLAN OF CARE
NURSING ADMISSION NOTE        Patient admitted via Cart  Oriented to room. Safety precautions initiated. Bed in low position.        08/17/21 2259 08/17/21 2307 08/17/21 2313   Vital Signs   BP 98/49 104/54 100/59   MAP (mmHg) 59 65 69   BP Location Right

## 2021-08-18 NOTE — CM/SW NOTE
Patient failed inpatient criteria. Second level of review completed and supports observation. UR committee in agreement. UR discussed with Dr. Joelle Bliss who approves observation status. Observation letter given to the patient and order written.     Ritika Mcgee

## 2021-08-18 NOTE — H&P
General Medicine H&P     Patient presents with:  Difficulty Breathing       PCP: Kori Galan MD    History of Present Illness: Patient is a 61year old female with PMH including but not limited to anxiety, DM, depression, GERD, HTN, Hl, obesity, who [address] METHODS Left 1996   • EGD SCOPE  6/07   • ERCP,DIAGNOSTIC  1992   • HERNIA SURGERY  7514    umbilical hernia    • HERNIA SURGERY  12/1992    abdominal hernia x 7   • KNEE REPLACEMENT SURGERY     • OTHER SURGICAL HISTORY  12/03/2014    kandi Rosario Quit date: 2019        Years since quittin.6      Smokeless tobacco: Never Used    Alcohol use:  Yes      Alcohol/week: 0.0 standard drinks      Comment: rare 2 drinks per year       Fam Hx  Family History   Problem Relation Age of Onset   • Hype  08/18/2021    K 3.4 08/18/2021     08/18/2021    CO2 29.0 08/18/2021     08/18/2021    CA 8.9 08/18/2021    ALB 3.7 08/17/2021    ALKPHO 110 08/17/2021    BILT 0.4 08/17/2021    TP 7.8 08/17/2021    AST 15 08/17/2021    ALT 18 08/17/202 ADA diet.  Will hold PO meds  - levemir 60u daily    # HL  -statin    # Major Depression/ Generalized anxiety d/o   -reviewed home meds, continue wellbutrin, currently appears stable     # Neuropathy  - gabapentin    # tob abuse  -Smokes tob off and on, pac

## 2021-08-18 NOTE — DISCHARGE PLANNING
D/C orders received. IV removed, IV catheter intact. Follow up appointments discussed. New meds prescribed to pharmacy. Discharge paperwork given and discussed. Patient taken to [facility] in wheelchair via transport.

## 2021-08-18 NOTE — ED PROVIDER NOTES
Patient Seen in: [facility] Emergency Department      History   Patient presents with:  Difficulty Breathing    Stated Complaint: coretta    HPI/Subjective:   HPI    Patient is a 60-year-old female comes to emergency room for evaluation of chest heavine COLONOSCOPY;  Surgeon: Jd Senior MD;  Location: [address] [place] [address] ENDOSCOPY   • CORRECT BUNION,OTHR METHODS Right 1996   • [place] METHODS Left 1996   • EGD SCOPE  6/07   • ERCP,DIAGNOSTIC  1992   • HERNIA SURGERY  5367    umbilical hernia    • HERNI 98.9 °F (37.2 °C) (Temporal)   Resp 18   Ht 167.6 cm (5' 6\")   Wt 120.2 kg   LMP 01/01/2009   SpO2 100%   BMI 42.77 kg/m²         Physical Exam    GENERAL: No acute distress, well appearing and non-toxic, Alert and oriented X 3   HEENT: Normocephalic, atr DIFFERENTIAL[[id number]]          Abnormal            Final result                 Please view results for these tests on the individual orders. EKG    Rate, intervals and axes as noted on EKG Report.   Rate: 92  Rhythm: Atrial flutter with variable bloc Case discussed with hospitalist Dr. Jessica Jolly as well as cardiology, Dr. Sonia Gonzalez         MDM      Patient is a 44-year-old female comes emergency room for evaluation of chest pain. Symptoms concerning for angina. EKG shows atrial flutter.   Heart score 5

## 2021-08-18 NOTE — PLAN OF CARE
Assumed care of pt at 0730. Pt A&Ox4. Aflutter on tele. Lung sounds clear and diminished bilaterally on room air. Pt denies pain and SOB at this time. Heparin gtt infusing per protocol. Plan for echo and lexiscan today. NPO since MN.  Pt and family updated

## 2021-08-18 NOTE — ED QUICK NOTES
Orders for admission, patient is aware of plan and ready to go upstairs. Any questions, please call ED KORY Berrios at extension [phone]. Vaccinated?  Yes  Type of COVID test sent: Rapid  COVID Suspicion level: Low       Titratable drug(s) infusing: n/a  Rate:

## 2021-08-18 NOTE — CONSULTS
[facility] Cardiology Consultation    [facility] Patient Status:  Observation    1955 MRN [id number]   [facility] 8NE-A Attending Yannick Messer MD   Hosp Day # 0 PCP Kori Galan MD     Reason for Consultation:  Chest pain, afib, umbilical hernia    • HERNIA SURGERY  12/1992    abdominal hernia x 7   • KNEE REPLACEMENT SURGERY     • OTHER SURGICAL HISTORY  12/03/2014    cysto Dr. Issa Mcclain    procedure to remove cyst from pancreas, developed abscess and r mg Oral Daily   • gabapentin  300 mg Oral TID   • insulin detemir  60 Units Subcutaneous Daily   • losartan-hydrochlorothiazide (HYZAAR 100/25) combination tablet (EEH only)   Oral Daily   • nortriptyline  25 mg Oral QPM   • torsemide  5 mg Oral Daily   • 08/18/21  0013 08/18/21  0603   TROP <0.045 <0.045 <0.045         Impression:   1. Chest pressure, markedly elevated calcium score. Multiple CV risks. 2. Afib/flutter - duration unknown. 3. Morbid obesity  4. DM  5. HTN  6. Mixed HL.     Plan:  Echo and

## 2021-08-30 PROBLEM — M72.2 PLANTAR FASCIITIS, BILATERAL: Status: RESOLVED | Noted: 2017-10-05 | Resolved: 2021-08-30

## 2021-08-30 PROBLEM — M20.41 HAMMER TOES OF BOTH FEET: Status: RESOLVED | Noted: 2018-05-03 | Resolved: 2021-08-30

## 2021-08-30 PROBLEM — R07.9 CHEST PAIN OF UNCERTAIN ETIOLOGY: Status: RESOLVED | Noted: 2021-08-17 | Resolved: 2021-08-30

## 2021-08-30 PROBLEM — M70.62 TROCHANTERIC BURSITIS OF BOTH HIPS: Status: RESOLVED | Noted: 2018-09-06 | Resolved: 2021-08-30

## 2021-08-30 PROBLEM — M46.1 SACROILIITIS (HCC): Status: RESOLVED | Noted: 2019-11-07 | Resolved: 2021-08-30

## 2021-08-30 PROBLEM — M72.2 PLANTAR FASCIITIS: Status: RESOLVED | Noted: 2018-09-05 | Resolved: 2021-08-30

## 2021-08-30 PROBLEM — M54.16 LUMBAR RADICULITIS: Status: RESOLVED | Noted: 2019-11-07 | Resolved: 2021-08-30

## 2021-08-30 PROBLEM — M70.61 TROCHANTERIC BURSITIS OF BOTH HIPS: Status: RESOLVED | Noted: 2018-09-06 | Resolved: 2021-08-30

## 2021-08-30 PROBLEM — M18.12 ARTHRITIS OF CARPOMETACARPAL (CMC) JOINT OF LEFT THUMB: Status: RESOLVED | Noted: 2017-08-02 | Resolved: 2021-08-30

## 2021-08-30 PROBLEM — K58.0 IRRITABLE BOWEL SYNDROME WITH DIARRHEA: Status: RESOLVED | Noted: 2017-01-05 | Resolved: 2021-08-30

## 2021-08-30 PROBLEM — G89.29 CHRONIC RIGHT SI JOINT PAIN: Status: RESOLVED | Noted: 2017-05-01 | Resolved: 2021-08-30

## 2021-08-30 PROBLEM — M75.101 ROTATOR CUFF SYNDROME OF RIGHT SHOULDER: Status: RESOLVED | Noted: 2020-09-03 | Resolved: 2021-08-30

## 2021-08-30 PROBLEM — M20.42 HAMMER TOES OF BOTH FEET: Status: RESOLVED | Noted: 2018-05-03 | Resolved: 2021-08-30

## 2021-08-30 PROBLEM — M53.3 CHRONIC RIGHT SI JOINT PAIN: Status: RESOLVED | Noted: 2017-05-01 | Resolved: 2021-08-30

## 2021-12-13 PROBLEM — R10.31 GROIN PAIN, RIGHT: Status: ACTIVE | Noted: 2021-12-13

## 2022-02-10 PROBLEM — S32.82XA MULTIPLE FRACTURES OF PELVIS WITHOUT DISRUPTION OF PELVIC RING, INITIAL ENCOUNTER FOR CLOSED FRACTURE (HCC): Status: ACTIVE | Noted: 2022-02-10

## 2022-02-10 PROBLEM — M80.00XA AGE-RELATED OSTEOPOROSIS WITH CURRENT PATHOLOGICAL FRACTURE, INITIAL ENCOUNTER: Status: ACTIVE | Noted: 2022-02-10

## 2022-02-10 PROBLEM — M81.0 OSTEOPOROSIS: Status: ACTIVE | Noted: 2022-02-10

## 2022-02-10 PROBLEM — S32.810A MULTIPLE CLOSED PELVIC FRACTURES WITH DISRUPTION OF PELVIC CIRCLE (HCC): Status: ACTIVE | Noted: 2022-02-10

## 2022-06-13 ENCOUNTER — HOSPITAL ENCOUNTER (EMERGENCY)
Facility: HOSPITAL | Age: 67
Discharge: HOME | End: 2022-06-13
Attending: EMERGENCY MEDICINE
Payer: MEDICARE

## 2022-06-13 VITALS
HEIGHT: 66 IN | RESPIRATION RATE: 16 BRPM | WEIGHT: 290 LBS | SYSTOLIC BLOOD PRESSURE: 152 MMHG | DIASTOLIC BLOOD PRESSURE: 98 MMHG | BODY MASS INDEX: 46.61 KG/M2 | TEMPERATURE: 98 F | OXYGEN SATURATION: 99 % | HEART RATE: 54 BPM

## 2022-06-13 DIAGNOSIS — J30.9 ALLERGIC RHINITIS, UNSPECIFIED SEASONALITY, UNSPECIFIED TRIGGER: ICD-10-CM

## 2022-06-13 DIAGNOSIS — K13.79 UVULAR EDEMA: Primary | ICD-10-CM

## 2022-06-13 PROCEDURE — 87430 STREP A AG IA: CPT | Performed by: EMERGENCY MEDICINE

## 2022-06-13 PROCEDURE — 99283 EMERGENCY DEPT VISIT LOW MDM: CPT

## 2022-06-13 RX ORDER — DEXAMETHASONE SODIUM PHOSPHATE 4 MG/ML
10 INJECTION, SOLUTION INTRA-ARTICULAR; INTRALESIONAL; INTRAMUSCULAR; INTRAVENOUS; SOFT TISSUE ONCE
Status: COMPLETED | OUTPATIENT
Start: 2022-06-13 | End: 2022-06-13

## 2022-06-13 RX ORDER — CETIRIZINE HYDROCHLORIDE 10 MG/1
10 TABLET ORAL DAILY
Qty: 30 TABLET | Refills: 0 | Status: SHIPPED | OUTPATIENT
Start: 2022-06-13 | End: 2022-07-13

## 2022-06-13 RX ORDER — FLUTICASONE PROPIONATE 50 MCG
2 SPRAY, SUSPENSION (ML) NASAL DAILY
Qty: 16 G | Refills: 0 | Status: SHIPPED | OUTPATIENT
Start: 2022-06-13 | End: 2022-07-13

## 2022-06-13 NOTE — ED INITIAL ASSESSMENT (HPI)
Pt ambulatory/walker to er with complaint of \"sounding like I have a cold\"  Pt reports she was working in her yard yesterday and this am woke to cold symptoms = took two benadryl and went to IC = sent here for swollen uvula  Pt states she sounds better now  Spouse at bs

## 2022-11-14 NOTE — LETTER
5/27/2021        [address] [facility] [address]    Dear Anil Jacinto,       Here are your results from your recent visit with Gastroenterology. C. difficile was negative. The calprotectin was minimally elevated raising the possibility of s No

## 2023-06-07 ENCOUNTER — TELEPHONE (OUTPATIENT)
Dept: SURGERY | Facility: CLINIC | Age: 68
End: 2023-06-07

## 2023-06-07 NOTE — TELEPHONE ENCOUNTER
Pt brought in MRI Spine Lumbar (w+wo) from 9/7/2022 imaging uploaded to Pacs successfully. Placed in  to be returned at next appt 6/15/23.

## 2023-06-08 NOTE — TELEPHONE ENCOUNTER
MRI Spine Lumbar (w+wo) from 9/7/2022      received by MA clinical staff, endorsed to provider for review. Placed on Jada's desk       Will be sent to scanning once received back from provider.

## 2023-06-15 ENCOUNTER — OFFICE VISIT (OUTPATIENT)
Dept: SURGERY | Facility: CLINIC | Age: 68
End: 2023-06-15
Payer: MEDICARE

## 2023-06-15 VITALS
SYSTOLIC BLOOD PRESSURE: 132 MMHG | HEART RATE: 82 BPM | WEIGHT: 290 LBS | HEIGHT: 66 IN | DIASTOLIC BLOOD PRESSURE: 76 MMHG | BODY MASS INDEX: 46.61 KG/M2

## 2023-06-15 DIAGNOSIS — G89.29 CHRONIC RIGHT-SIDED LOW BACK PAIN, UNSPECIFIED WHETHER SCIATICA PRESENT: Primary | ICD-10-CM

## 2023-06-15 DIAGNOSIS — M54.50 CHRONIC RIGHT-SIDED LOW BACK PAIN, UNSPECIFIED WHETHER SCIATICA PRESENT: Primary | ICD-10-CM

## 2023-06-15 PROBLEM — G20 PARKINSON'S DISEASE (HCC): Status: ACTIVE | Noted: 2023-06-15

## 2023-06-15 PROBLEM — G20.A1 PARKINSON'S DISEASE: Status: ACTIVE | Noted: 2023-06-15

## 2023-06-15 PROBLEM — G20.A1 PARKINSON'S DISEASE (HCC): Status: ACTIVE | Noted: 2023-06-15

## 2023-06-15 PROCEDURE — 99203 OFFICE O/P NEW LOW 30 MIN: CPT | Performed by: NEUROLOGICAL SURGERY

## 2023-06-15 RX ORDER — OMEPRAZOLE 20 MG/1
20 CAPSULE, DELAYED RELEASE ORAL DAILY
COMMUNITY
Start: 2023-04-14

## 2023-06-15 RX ORDER — MELOXICAM 7.5 MG/1
7.5 TABLET ORAL DAILY
COMMUNITY
Start: 2022-09-07

## 2023-06-15 RX ORDER — KRILL/OM-3/DHA/EPA/PHOSPHO/AST 500MG-86MG
500 CAPSULE ORAL DAILY
COMMUNITY

## 2023-06-15 RX ORDER — LISINOPRIL 10 MG/1
10 TABLET ORAL DAILY
COMMUNITY
Start: 2023-05-25

## 2023-06-15 RX ORDER — TIRZEPATIDE 5 MG/.5ML
5 INJECTION, SOLUTION SUBCUTANEOUS WEEKLY
COMMUNITY
Start: 2023-05-25

## 2023-06-15 RX ORDER — VERAPAMIL HYDROCHLORIDE 40 MG/1
40 TABLET ORAL 2 TIMES DAILY
COMMUNITY
Start: 2023-04-24

## 2023-06-15 RX ORDER — LOSARTAN POTASSIUM 25 MG/1
25 TABLET ORAL DAILY
COMMUNITY

## 2023-06-15 RX ORDER — METOPROLOL SUCCINATE 50 MG/1
50 TABLET, EXTENDED RELEASE ORAL DAILY
COMMUNITY
Start: 2023-05-09

## 2023-06-15 RX ORDER — MELOXICAM 15 MG/1
15 TABLET ORAL DAILY
Qty: 30 TABLET | Refills: 2 | Status: SHIPPED | OUTPATIENT
Start: 2023-06-15

## 2023-06-15 NOTE — PROGRESS NOTES
New patient:  Reason for visit:   Lumbar Pain      Estimated time of onset:  Couple of year(s)    Numeric Rating Scale:      Pain at Present:  7/10                                                                                                                       Distribution of Pain:    bilateral  Low back  Past Treatments for Current Pain Condition:   NSAIDS and Ice treatment  Response to treatment: some relief, short term    Prior diagnostic testing related to this condition:      MRI Spine Lumbar-- 9/7/2022   Uploaded in PACs

## 2023-07-06 ENCOUNTER — TELEPHONE (OUTPATIENT)
Dept: SURGERY | Facility: CLINIC | Age: 68
End: 2023-07-06

## 2023-07-06 NOTE — TELEPHONE ENCOUNTER
Attempted to reach patient but it went to voicemail. If pt means a spinal cord stimulator, pain management does a trial to see if this helps before we would discuss surgical placement.   If not, then I am not sure what device she is referring to

## 2023-07-06 NOTE — TELEPHONE ENCOUNTER
Noted the patient request listed below. Noted the pt LOV on 6/15/2023:  \"ASSESSMENT:  Right sided Lumbago      PLAN:  1. Physical therapy 3 x a week   2. Meloxicam for pain   3.  Pain injections with pain Dr. LOMELI  4. F/u in office 6-8 weeks\"    Routed to the providers to review and provide feedback

## 2023-07-12 ENCOUNTER — ORDER TRANSCRIPTION (OUTPATIENT)
Dept: PHYSICAL THERAPY | Facility: HOSPITAL | Age: 68
End: 2023-07-12

## 2023-07-12 ENCOUNTER — TELEPHONE (OUTPATIENT)
Dept: PHYSICAL THERAPY | Facility: HOSPITAL | Age: 68
End: 2023-07-12

## 2023-07-12 DIAGNOSIS — G89.29 CHRONIC RIGHT-SIDED LOW BACK PAIN, UNSPECIFIED WHETHER SCIATICA PRESENT: Primary | ICD-10-CM

## 2023-07-12 DIAGNOSIS — M54.50 CHRONIC RIGHT-SIDED LOW BACK PAIN, UNSPECIFIED WHETHER SCIATICA PRESENT: Primary | ICD-10-CM

## 2023-07-26 ENCOUNTER — TELEPHONE (OUTPATIENT)
Dept: SURGERY | Facility: CLINIC | Age: 68
End: 2023-07-26

## 2023-07-26 NOTE — TELEPHONE ENCOUNTER
Plan of Care from Emanuel 81 received by MA clinical staff, endorsed to provider for review and signature. Placed on SAY Webster's desk. Will be faxed and sent to scan once received back from provider.

## 2023-07-28 NOTE — TELEPHONE ENCOUNTER
Plan of Care from [facility]  for 7/18/2023 to 8/14/2023 paperwork reviewed and signed by provider, paperwork faxed to #[phone].   Confirmation received and copy sent to scan

## 2023-11-27 NOTE — PLAN OF CARE
2847: Paged Dr. Myke Jamil to notify of [facility] cardiology consult    5999: Notified Dr. Myke Jamil of consult & brief report given to him re: pt - new order rc'd for echo. Order initiated. Pt updated. Pt endorsed to [facility].
4198: Paged Dr. Kayla Kirkland as PTA medications were reconciled prior to RN reviewing & verifying correct meds & dosages with pt & there are some changes; also pt's K+ 3.7 & pt is not on electrolyte protocol. 1396: No call back from Dr. Kayla Kirkland - repaged.
DX: Chest Pain    Data:  Pt lying in bed. Pt denies any CP, SOB or nausea @ this time.   Pt states when she does get pain it is in R chest & is sharp & lasts for 1-2 minutes, pt states pain sometimes starts in R arm in antecub region & radiates to chest.
Problem: CARDIOVASCULAR - ADULT  Goal: Maintains optimal cardiac output and hemodynamic stability  INTERVENTIONS:  - Monitor vital signs, rhythm, and trends  - Monitor for bleeding, hypotension and signs of decreased cardiac output  - Evaluate effectivenes
Problem: CARDIOVASCULAR - ADULT  Goal: Maintains optimal cardiac output and hemodynamic stability  INTERVENTIONS:  - Monitor vital signs, rhythm, and trends  - Monitor for bleeding, hypotension and signs of decreased cardiac output  - Evaluate effectivenes
Pt arrived to floor per transport personnel.  @ bedside. ELISHA Esquivel in room with pt & will apply tele monitor, obtain wt & will obtain orthostatic VS.  Pt denies any pain @ this time. BLE dry - aloe vesta applied.   This RN will complete assessment
No

## 2024-01-23 ENCOUNTER — HOSPITAL ENCOUNTER (OUTPATIENT)
Dept: CV DIAGNOSTICS | Facility: HOSPITAL | Age: 69
Discharge: HOME | End: 2024-01-23
Attending: INTERNAL MEDICINE
Payer: MEDICARE

## 2024-01-23 DIAGNOSIS — I48.91 AFIB (HCC): ICD-10-CM

## 2024-01-23 DIAGNOSIS — I10 HYPERTENSION: ICD-10-CM

## 2024-01-23 PROCEDURE — 93306 TTE W/DOPPLER COMPLETE: CPT | Performed by: INTERNAL MEDICINE

## 2024-02-07 ENCOUNTER — TELEPHONE (OUTPATIENT)
Dept: ORTHOPEDICS CLINIC | Facility: CLINIC | Age: 69
End: 2024-02-07

## 2024-02-07 DIAGNOSIS — M54.50 LOW BACK PAIN, UNSPECIFIED BACK PAIN LATERALITY, UNSPECIFIED CHRONICITY, UNSPECIFIED WHETHER SCIATICA PRESENT: Primary | ICD-10-CM

## 2024-02-07 NOTE — TELEPHONE ENCOUNTER
Future Appointments   Date Time Provider Department Center   2/13/2024  2:20 PM Manuel Chao MD [facility]       This patient is coming for Lower back herniated disc. No prior imaging done yet. Please advise if views are needed for this appt. Thanks.    Patient may be reached at [phone]

## 2024-02-13 ENCOUNTER — HOSPITAL ENCOUNTER (OUTPATIENT)
Dept: GENERAL RADIOLOGY | Age: 69
Discharge: HOME | End: 2024-02-13
Attending: STUDENT IN AN ORGANIZED HEALTH CARE EDUCATION/TRAINING PROGRAM
Payer: MEDICARE

## 2024-02-13 ENCOUNTER — OFFICE VISIT (OUTPATIENT)
Dept: ORTHOPEDICS CLINIC | Facility: CLINIC | Age: 69
End: 2024-02-13
Payer: MEDICARE

## 2024-02-13 VITALS — WEIGHT: 285 LBS | BODY MASS INDEX: 45.8 KG/M2 | HEIGHT: 66 IN

## 2024-02-13 DIAGNOSIS — M54.50 LOW BACK PAIN, UNSPECIFIED BACK PAIN LATERALITY, UNSPECIFIED CHRONICITY, UNSPECIFIED WHETHER SCIATICA PRESENT: ICD-10-CM

## 2024-02-13 DIAGNOSIS — M48.061 SPINAL STENOSIS OF LUMBAR REGION WITHOUT NEUROGENIC CLAUDICATION: Primary | ICD-10-CM

## 2024-02-13 PROCEDURE — 99205 OFFICE O/P NEW HI 60 MIN: CPT | Performed by: STUDENT IN AN ORGANIZED HEALTH CARE EDUCATION/TRAINING PROGRAM

## 2024-02-13 PROCEDURE — 72100 X-RAY EXAM L-S SPINE 2/3 VWS: CPT | Performed by: STUDENT IN AN ORGANIZED HEALTH CARE EDUCATION/TRAINING PROGRAM

## 2024-02-13 RX ORDER — HYDROCODONE BITARTRATE AND ACETAMINOPHEN 5; 325 MG/1; MG/1
1 TABLET ORAL 3 TIMES DAILY PRN
COMMUNITY
Start: 2024-01-16

## 2024-02-13 NOTE — H&P
[facility] - ORTHOPEDICS  [address], [address], [place] [address]  [address] [place] [id number]  [phone]     NEW PATIENT VISIT - HISTORY AND PHYSICAL EXAMINATION     Name: Shea Hightower   MRN: [id number]  Date: 02/13/24       CC:   Chief Complaint   Patient presents with    Back Pain     Low back disc herniation x3- She is seeing Dr. Rodriguez for pain, recent ablation about 6 weeks ago, steroid inj yesterday.- No sx on the back, she has previously done PT. No acc or injury. Steroid is helping with pain, she was close to not being able to walk. Previous injection over 1 year ago.      REFERRED BY: Georges Eduardo MD    HPI:   Shea Hightower is a very pleasant 68 year old female who presents today for evaluation of back and leg pain. The distribution of symptoms are: 50% left low back pain and 50% left anterior leg pain from the knee to the top of the foot. The symptoms began 1 year(s) ago without any significant injury. Since the onset, the symptoms have become slowly worse over time. Patient feels pain is aggravated by standing, walking and improved by rest. The patient reports intermittent numbness and no weakness.  The symptom characteristics are as follows: Patient is a 68-year-old female with chronic low back pain managed by [facility] pain clinic, and has had multiple SI joint RFA.  Patient has also left lower extremity radicular pain and recently had a lumbar epidural steroid injection with significant improvement in symptoms    Prior spine surgery: none.    Bowel and bladder symptoms: absent.    The patient has not had issues with balance and/or hand dexterity problems such as changes in penmanship or the use of buttons or zippers.    Treatment up to this time has included:    Evaluation: PCP and other [facility] provider  NSAIDS: have been partially helpful  Narcotic use: None  Physical therapy: None  Spinal injections:   Others:   Date Procedure Relief Duration of Relief    2024 LESI    \"24 Repeat: Bilateral SI -radiofrequency ablation   23 Bilateral SI -radiofrequency ablation 10%   10/23/23 Bilateral SIJ nerve blocks#2 90+% 2-5 hours   2023 Bilateral SIJ nerve blocks #1 90% 1 month   2023 Bilateral SIJ injections 100% 2 days   2023 Right SIJ 50% 4-5 days   3/27/2023 Bilateral L4/5 and L5/S1 RFA 5% N/A   2023 Bilateral L4/5 and L5/S1 MBNB 100% 2 hrs   2022 L5-S1 HALLIE 80% 1 week   11/10/2022 L5/S1 HALLIE 85% Few wks    Right L5 and S1-3 RFA Excelllent 2.5 yrs\"    PMH:   Past Medical History:   Diagnosis Date    ANXIETY     Anxiety     Arrhythmia     Cataract     Depression     DIABETES     Diverticulosis of large intestine     Esophageal reflux     Heart palpitations 2016    High blood pressure     High cholesterol     HYPERTENSION     IBS     IBS (irritable bowel syndrome)     OBESITY     Osteoarthrosis, unspecified whether generalized or localized, unspecified site     Pancreatitis     ERCP induced    PONV (postoperative nausea and vomiting)     Type II or unspecified type diabetes mellitus without mention of complication, not stated as uncontrolled around     Unspecified essential hypertension     Visual impairment     wears reading glasses       PAST SURGICAL HX:  Past Surgical History:   Procedure Laterality Date                CARPAL TUNNEL RELEASE Left 2015    CEA, PANCREATIC CYST FLUID      had pancreatic cyst surgery    CHOLECYSTECTOMY      COLONOSCOPY      had multiple colonosopy's prior to  also    COLONOSCOPY N/A 2017    Procedure: COLONOSCOPY;  Surgeon: Michael Sorto MD;  Location: [facility] ENDOSCOPY    CORRECT BUNION,OTHR METHODS Right     CORRECT BUNION,OTHR METHODS Left     EGD SCOPE      ERCP,DIAGNOSTIC      HERNIA SURGERY      umbilical hernia     HERNIA SURGERY  1992    abdominal hernia x 7    KNEE REPLACEMENT SURGERY      OTHER SURGICAL HISTORY   12/03/2014    cysto Dr. Maddox    OTHER SURGICAL HISTORY  1993    procedure to remove cyst from pancreas, developed abscess and required 2nd procedure    OTHER SURGICAL HISTORY Left 1996    procedure to remove strep infection from left thigh    OTHER SURGICAL HISTORY Bilateral 1999    bunionectomy    OTHER SURGICAL HISTORY Bilateral 2003    hammertoe correction    REMOVAL GALLBLADDER      REMOVAL OF TONSILS,12+ Y/O      REPAIR ING HERNIA,5+Y/O,REDUCIBL      SPLINT, HAMMER TOE Right 1998    SPLINT, HAMMER TOE Left 1998    SURGERY - EXTERNAL  1996    surgery d/t strep infection in R thigh    TONSILLECTOMY  1964    TOTAL KNEE REPLACEMENT Left 5/2015    TOTAL KNEE REPLACEMENT Right 7/2015    UPPER GI ENDOSCOPY,EXAM  1992       FAMILY HX:  Family History   Problem Relation Age of Onset    Hypertension Father     Cancer Mother         breast    Diabetes Mother     Hypertension Mother     Obesity Mother     Heart Disorder Mother         mitral valve prolapse & CHF    Breast Cancer Mother         age 51 & 70\"s    Other (Other) Mother     Cancer Paternal Grandfather         lung cancer    Diabetes Brother     Cancer Brother         pancreatic cancer    Heart Disorder Brother         pacemaker       ALLERGIES:  Abaloparatide; Red pepper; Denosumab; Bactrim [sulfamethoxazole w/trimethoprim, co-trimoxazole]; Salsalate; Adhesive tape; Cefadroxil; Duricef [cefadroxil monohydrate]; Latex; and Latex    MEDICATIONS:   Current Outpatient Medications   Medication Sig Dispense Refill    HYDROcodone-acetaminophen 5-325 MG Oral Tab Take 1 tablet by mouth 3 (three) times daily as needed.      Krill Oil 500 MG Oral Cap Take 500 mg by mouth daily.      lisinopril 10 MG Oral Tab Take 1 tablet (10 mg total) by mouth daily.      Meloxicam 7.5 MG Oral Tab Take 1 tablet (7.5 mg total) by mouth daily.      losartan 25 MG Oral Tab Take 1 tablet (25 mg total) by mouth daily.      omeprazole 20 MG Oral Capsule Delayed Release Take 1 capsule (20 mg  total) by mouth daily.      MOUNJARO 5 MG/0.5ML Subcutaneous Solution Pen-injector Inject 5 mg into the skin once a week.      verapamil 40 MG Oral Tab Take 1 tablet (40 mg total) by mouth 2 (two) times daily.      metoprolol succinate ER 50 MG Oral Tablet 24 Hr Take 1 tablet (50 mg total) by mouth daily.      Meloxicam 15 MG Oral Tab Take 1 tablet (15 mg total) by mouth daily. Take with food 30 tablet 2    ALPRAZolam 0.5 MG Oral Tab Take 1 tablet (0.5 mg total) by mouth every 6 (six) hours as needed. 100 tablet 0    gabapentin 400 MG Oral Cap Take 1 capsule (400 mg total) by mouth 3 (three) times daily. 90 capsule 2    PRAMIPEXOLE 0.125 MG Oral Tab TAKE 1 TABLET (0.125 MG TOTAL) BY MOUTH NIGHTLY AS NEEDED. 30 tablet 0    JARDIANCE 25 MG Oral Tab TAKE 1 TABLET BY MOUTH EVERY DAY 90 tablet 0    Glucose Blood (RELION TRUE METRIX TEST STRIPS) In Vitro Strip Test twice daily. 200 strip 3    Blood Glucose Monitoring Suppl (TRUE METRIX METER) w/Device Does not apply Kit Test twice daily. 1 kit 0    TRUEplus Lancets 33G Does not apply Misc Test twice daily. 200 each 3    fluconazole 100 MG Oral Tab Take 1 tablet (100 mg total) by mouth daily. 7 tablet 0    TORSEMIDE 5 MG Oral Tab TAKE 1 TABLET BY MOUTH EVERY DAY 90 tablet 3    Abaloparatide 3120 MCG/1.56ML Subcutaneous Solution Pen-injector Inject 80 mcg into the skin daily. 3 each 3    Insulin Pen Needle (BD PEN NEEDLE MINI U/F) 31G X 5 MM Does not apply Misc Use one needle with Tymlos pen daily. 100 each 3    Calcium-Phosphorus-Vitamin D (CALCIUM/VITAMIN D3/ADULT GUMMY OR) Take 500 mg by mouth daily.      Insulin Aspart Pen (NOVOLOG FLEXPEN) 100 UNIT/ML Subcutaneous Solution Pen-injector 10 units before lunch and supper, with sliding scale for correction. Maximum daily dose 40 units. 15 mL 0    Blood Glucose Monitoring Suppl (ACCU-CHEK GUIDE) w/Device Does not apply Kit 1 kit 2 (two) times daily. 1 kit 0    Accu-Chek FastClix Lancets Does not apply Misc 1 lancet 2 (two)  times daily. E11.65 May substitute for formulary, per insurance. 102 each 3    Glucose Blood (ONETOUCH TEST) In Vitro Strip Test twice daily 200 strip 2    Continuous Blood Gluc  (FREESTYLE ARIEL 14 DAY READER) Does not apply Device Use to test sugar twice a day 1 each 0    Continuous Blood Gluc Sensor (FREESTYLE ARIEL 14 DAY SENSOR) Does not apply Misc Use one sensor on skin every 14 days 6 each 3    BUPROPION 150 MG Oral Tablet 24 Hr TAKE 1 TABLET BY MOUTH EVERY DAY 90 tablet 1    NORTRIPTYLINE 25 MG Oral Cap TAKE 1 CAPSULE (25 MG TOTAL) BY MOUTH EVERY EVENING. 90 capsule 2    ELIQUIS 5 MG Oral Tab TAKE 1 TABLET BY MOUTH TWICE A  tablet 1    Zinc Gluconate 50 MG Oral Tab Take by mouth.      metoprolol succinate 25 MG Oral Tablet 24 Hr Take 1 tablet (25 mg total) by mouth Daily Beta Blocker. 90 tablet 3    fluconazole 200 MG Oral Tab Take 1 tablet (200 mg total) by mouth daily. 3 tablet 0    Dulaglutide (TRULICITY) 3 MG/0.5ML Subcutaneous Solution Pen-injector Inject 3 mg into the skin once a week. 12 each 4    atorvastatin 20 MG Oral Tab Take 1 tablet (20 mg total) by mouth daily. 90 tablet 3    insulin glargine (LANTUS SOLOSTAR) 100 UNIT/ML Subcutaneous Solution Pen-injector Inject 70 Units into the skin daily. 50 mL 10    metFORMIN HCl 1000 MG Oral Tab Take 1 tablet (1,000 mg total) by mouth 2 (two) times daily. 180 tablet 3    MOMETASONE 0.1 % External Ointment APPLY TOPICALLY TO AFFECTED AREA EVERY DAY 15 g 0    Insulin Pen Needle (BD PEN NEEDLE SHORT U/F) 31G X 8 MM Does not apply Misc USE WITH INSULIN AS DIRECTED. 200 each 3    DICLOFENAC SODIUM 1 % External Gel APPLY 2 GRAMS TO AFFECTED AREA 4 TIMES A  g 3    GABAPENTIN 300 MG Oral Cap TAKE 1 CAPSULE BY MOUTH THREE TIMES A DAY (Patient taking differently: 2 (two) times daily.) 270 capsule 4    Meclizine HCl 25 MG Oral Tab TAKE 1 TABLET BY MOUTH THREE TIMES A DAY AS NEEDED 60 tablet 2    Blood Glucose Monitoring Suppl (CONTOUR NEXT EZ)  w/Device Does not apply Kit 1 kit by Does not apply route 2 (two) times daily. 1 kit 0    MICROLET LANCETS Does not apply Misc Test twice daily. 100 each 1    Lancet Devices (JESSICA MICROLET 2 LANCING DEVIC) Does not apply Misc Test twice daily. 1 each 0    Insulin Pen Needle 32G X 6 MM Does not apply Misc Use for novolog sliding scale 3x daily and lantus nightly 400 each 1    Elastic Bandages & Supports (JOBST ACTIVE 15-20MMHG MEDIUM) Does not apply Misc 1 Application by Does not apply route daily. 10 each 1    Blood Glucose Monitoring Suppl (ONETOUCH ULTRA MINI) w/Device Does not apply Kit Test two times daily 1 kit 0    ONETOUCH ULTRASOFT LANCETS Does not apply Misc Test twice daily 200 each 1       ROS: A comprehensive 14 point review of systems was performed and was negative aside from the aforementioned per history of present illness.    SOCIAL HX:  Social History     Tobacco Use    Smoking status: Former     Packs/day: 0.01     Years: 25.00     Additional pack years: 0.00     Total pack years: 0.25     Types: Cigarettes     Quit date: 2019     Years since quittin.1    Smokeless tobacco: Never   Substance Use Topics    Alcohol use: Yes     Alcohol/week: 0.0 standard drinks of alcohol     Comment: rare 2 drinks per year         PE:   Vitals:    24 1436   Weight: 285 lb (129.3 kg)   Height: 5' 6\" (1.676 m)     Estimated body mass index is 46 kg/m² as calculated from the following:    Height as of this encounter: 5' 6\" (1.676 m).    Weight as of this encounter: 285 lb (129.3 kg).    Physical Exam  Constitutional:       Appearance: Normal appearance.   HENT:      Head: Normocephalic and atraumatic.   Eyes:      Extraocular Movements: Extraocular movements intact.   Cardiovascular:      Pulses: Normal pulses. Skin warm and well perfused.  Pulmonary:      Effort: Pulmonary effort is normal. No respiratory distress.   Skin:     General: Skin is warm.   Psychiatric:         Mood and Affect: Mood normal.      Spine Exam:    Normal gait without difficulty  Able to heel, toe, tandem gait without difficulty  Level shoulders and hips in even stance    Restricted L-spine ROM    No tenderness to palpation of L-spine    Straight leg raise test: negative    Sustained clonus: negative    LE Strength: 5/5 IP QUAD TA EHL GSC  LE Sensation: normal in L2-S1 distribution  LE reflexes: normal    Radiographic Examination/Diagnostics:  xray/MRI personally viewed, independently interpreted and radiology report was reviewed.    X-ray of the lumbar spine demonstrates degenerative scoliosis  MRI of the lumbar spine demonstrates left foraminal disc herniation at L5-S1, otherwise no significant canal or foraminal stenosis      IMPRESSION: Shea Hightower is a 68 year old female with degenerative scoliosis and left L5 radiculitis    PLAN:     We reviewed the patients history, symptoms, exam findings, and imaging today.  We had a detailed discussion outlining the etiology, anatomy, pathophysiology, and natural history of lumbar stenosis. The typical management of this condition may include lifestyle modification, NSAIDs, physical therapy, oral steroids, epidural injections, and neuromodulatory medications.    - Discussed operative and non operative treatment options  - Based on our discussion today we would like to have the patient initiate our recommendations for continued conservative therapy in the treatment of their condition noted in the assessment section.     - Referred to Physical Therapy: home exercise program, aerobic exercises, core strengthening and conditioning, possible manipulative therapy,  and modalities as indicated  - Referred patient to weight loss clinic    FOLLOW-UP:  We will see her back in follow-up in 3 months, or sooner if any problems arise. Patient understands and agrees with plan.      Manuel Chao MD  Orthopedic Spine Surgeon  [facility]   [address] [place] [address], [address], [place] [address]  [address] [place] [id number]   [email or web address]  [email or web address]  t: [phone]   f: [phone]        This note was dictated using Dragon software.  While it was briefly proofread prior to completion, some grammatical, spelling, and word choice errors due to dictation may still occur.

## 2024-02-19 ENCOUNTER — MED REC SCAN ONLY (OUTPATIENT)
Dept: FAMILY MEDICINE CLINIC | Facility: CLINIC | Age: 69
End: 2024-02-19

## 2024-03-27 ENCOUNTER — HOSPITAL ENCOUNTER (EMERGENCY)
Facility: HOSPITAL | Age: 69
Discharge: HOME | End: 2024-03-27
Attending: EMERGENCY MEDICINE
Payer: MEDICARE

## 2024-03-27 ENCOUNTER — APPOINTMENT (OUTPATIENT)
Dept: GENERAL RADIOLOGY | Facility: HOSPITAL | Age: 69
End: 2024-03-27
Attending: EMERGENCY MEDICINE
Payer: MEDICARE

## 2024-03-27 ENCOUNTER — APPOINTMENT (OUTPATIENT)
Dept: CT IMAGING | Facility: HOSPITAL | Age: 69
End: 2024-03-27
Attending: EMERGENCY MEDICINE
Payer: MEDICARE

## 2024-03-27 VITALS
DIASTOLIC BLOOD PRESSURE: 83 MMHG | HEART RATE: 63 BPM | SYSTOLIC BLOOD PRESSURE: 154 MMHG | RESPIRATION RATE: 18 BRPM | OXYGEN SATURATION: 100 % | TEMPERATURE: 97 F

## 2024-03-27 DIAGNOSIS — R10.9 ABDOMINAL PAIN OF UNKNOWN ETIOLOGY: ICD-10-CM

## 2024-03-27 DIAGNOSIS — R07.9 CHEST PAIN OF UNCERTAIN ETIOLOGY: Primary | ICD-10-CM

## 2024-03-27 LAB
ALBUMIN SERPL-MCNC: 3.5 G/DL (ref 3.4–5)
ALBUMIN/GLOB SERPL: 0.9 {RATIO} (ref 1–2)
ALP LIVER SERPL-CCNC: 108 U/L
ALT SERPL-CCNC: 13 U/L
ANION GAP SERPL CALC-SCNC: 4 MMOL/L (ref 0–18)
AST SERPL-CCNC: 16 U/L (ref 15–37)
BASOPHILS # BLD AUTO: 0.06 X10(3) UL (ref 0–0.2)
BASOPHILS NFR BLD AUTO: 0.5 %
BILIRUB SERPL-MCNC: 0.4 MG/DL (ref 0.1–2)
BUN BLD-MCNC: 21 MG/DL (ref 9–23)
CALCIUM BLD-MCNC: 9.9 MG/DL (ref 8.5–10.1)
CHLORIDE SERPL-SCNC: 109 MMOL/L (ref 98–112)
CO2 SERPL-SCNC: 30 MMOL/L (ref 21–32)
CREAT BLD-MCNC: 0.91 MG/DL
EGFRCR SERPLBLD CKD-EPI 2021: 70 ML/MIN/1.73M2 (ref 60–?)
EOSINOPHIL # BLD AUTO: 0.65 X10(3) UL (ref 0–0.7)
EOSINOPHIL NFR BLD AUTO: 5.5 %
ERYTHROCYTE [DISTWIDTH] IN BLOOD BY AUTOMATED COUNT: 14.7 %
GLOBULIN PLAS-MCNC: 3.9 G/DL (ref 2.8–4.4)
GLUCOSE BLD-MCNC: 46 MG/DL (ref 70–99)
GLUCOSE BLD-MCNC: 46 MG/DL (ref 70–99)
GLUCOSE BLD-MCNC: 64 MG/DL (ref 70–99)
GLUCOSE BLD-MCNC: 82 MG/DL (ref 70–99)
GLUCOSE BLD-MCNC: 95 MG/DL (ref 70–99)
HCT VFR BLD AUTO: 42.6 %
HGB BLD-MCNC: 13.2 G/DL
IMM GRANULOCYTES # BLD AUTO: 0.03 X10(3) UL (ref 0–1)
IMM GRANULOCYTES NFR BLD: 0.3 %
LIPASE SERPL-CCNC: 34 U/L (ref 13–75)
LYMPHOCYTES # BLD AUTO: 2.8 X10(3) UL (ref 1–4)
LYMPHOCYTES NFR BLD AUTO: 23.6 %
MCH RBC QN AUTO: 27.2 PG (ref 26–34)
MCHC RBC AUTO-ENTMCNC: 31 G/DL (ref 31–37)
MCV RBC AUTO: 87.7 FL
MONOCYTES # BLD AUTO: 0.8 X10(3) UL (ref 0.1–1)
MONOCYTES NFR BLD AUTO: 6.8 %
NEUTROPHILS # BLD AUTO: 7.5 X10 (3) UL (ref 1.5–7.7)
NEUTROPHILS # BLD AUTO: 7.5 X10(3) UL (ref 1.5–7.7)
NEUTROPHILS NFR BLD AUTO: 63.3 %
OSMOLALITY SERPL CALC.SUM OF ELEC: 298 MOSM/KG (ref 275–295)
PLATELET # BLD AUTO: 302 10(3)UL (ref 150–450)
POTASSIUM SERPL-SCNC: 3.9 MMOL/L (ref 3.5–5.1)
PROT SERPL-MCNC: 7.4 G/DL (ref 6.4–8.2)
RBC # BLD AUTO: 4.86 X10(6)UL
SODIUM SERPL-SCNC: 143 MMOL/L (ref 136–145)
TROPONIN I SERPL HS-MCNC: 14 NG/L
WBC # BLD AUTO: 11.8 X10(3) UL (ref 4–11)

## 2024-03-27 PROCEDURE — 99285 EMERGENCY DEPT VISIT HI MDM: CPT

## 2024-03-27 PROCEDURE — 82962 GLUCOSE BLOOD TEST: CPT

## 2024-03-27 PROCEDURE — 74177 CT ABD & PELVIS W/CONTRAST: CPT | Performed by: EMERGENCY MEDICINE

## 2024-03-27 PROCEDURE — 83690 ASSAY OF LIPASE: CPT | Performed by: EMERGENCY MEDICINE

## 2024-03-27 PROCEDURE — 96374 THER/PROPH/DIAG INJ IV PUSH: CPT

## 2024-03-27 PROCEDURE — 93005 ELECTROCARDIOGRAM TRACING: CPT

## 2024-03-27 PROCEDURE — 83690 ASSAY OF LIPASE: CPT

## 2024-03-27 PROCEDURE — 71045 X-RAY EXAM CHEST 1 VIEW: CPT | Performed by: EMERGENCY MEDICINE

## 2024-03-27 PROCEDURE — 93010 ELECTROCARDIOGRAM REPORT: CPT

## 2024-03-27 PROCEDURE — 80053 COMPREHEN METABOLIC PANEL: CPT | Performed by: EMERGENCY MEDICINE

## 2024-03-27 PROCEDURE — 80053 COMPREHEN METABOLIC PANEL: CPT

## 2024-03-27 PROCEDURE — 85025 COMPLETE CBC W/AUTO DIFF WBC: CPT

## 2024-03-27 PROCEDURE — 84484 ASSAY OF TROPONIN QUANT: CPT

## 2024-03-27 PROCEDURE — 84484 ASSAY OF TROPONIN QUANT: CPT | Performed by: EMERGENCY MEDICINE

## 2024-03-27 PROCEDURE — 85025 COMPLETE CBC W/AUTO DIFF WBC: CPT | Performed by: EMERGENCY MEDICINE

## 2024-03-27 RX ORDER — DEXTROSE MONOHYDRATE 25 G/50ML
50 INJECTION, SOLUTION INTRAVENOUS ONCE
Status: COMPLETED | OUTPATIENT
Start: 2024-03-27 | End: 2024-03-27

## 2024-03-27 RX ORDER — DEXTROSE MONOHYDRATE 25 G/50ML
INJECTION, SOLUTION INTRAVENOUS
Status: COMPLETED
Start: 2024-03-27 | End: 2024-03-27

## 2024-03-28 NOTE — ED PROVIDER NOTES
Patient Seen in: [facility] Emergency Department      History     Chief Complaint   Patient presents with    Chest Pain Angina    Nausea/Vomiting/Diarrhea     Stated Complaint: CP, sick for 2.5 weeks with diarrhea, vomiting and chills    Subjective:   HPI    68-year-old female who presents to the emergency department complaining of having chest pain that hurts when she takes in a deep breath.  She also says she has been feeling ill for approximately 2 and half weeks where she had loose stools vomiting and chills.  She had a history of C. difficile in the past but she said that her diarrhea has not been that significant.  No recent antibiotic therapy.  No fevers associated with the chills.  Reviewing her record she was seen earlier today on 3/27/2024 at the immediate care for the chest discomfort and they sent her here for evaluation.  The patient tells me that she did recently have a C. difficile that was reassessed that was negative.  Her  tells me that she is also had multiple bouts of hypoglycemia.  The patient corroborates this and says that she usually takes oral glucose tablets    Objective:   No pertinent past medical history.            No pertinent past surgical history.              No pertinent social history.            Review of Systems    Positive for stated complaint: CP, sick for 2.5 weeks with diarrhea, vomiting and chills  Other systems are as noted in HPI.  Constitutional and vital signs reviewed.      All other systems reviewed and negative except as noted above.    Physical Exam     ED Triage Vitals [03/27/24 1727]   /52   Pulse 63   Resp 18   Temp    Temp src    SpO2 100 %   O2 Device None (Room air)       Current:BP (!) 125/99   Pulse 63   Resp 18   LMP 01/01/2009 (LMP Unknown)   SpO2 100%         Physical Exam  General: This a pleasant nontoxic appearing patient in no apparent distress alert and oriented ×3  HEENT: Pupils are equal reactive to light.  Extra ocular  motions are intact.  No scleral icterus or conjunctival pallor.  Head is atraumatic normocephalic.    Lungs: Clear to auscultation bilaterally.  No wheezes, rhonchi, or rales appreciated.  No accessory muscle use noted for breathing.  Cardiac: Regular rate and rhythm.  Normal S1 and 2 without murmurs or ectopy appreciated  Abdomen: Soft on examination without tenderness to deep palpation or to percussion.  No masses appreciated.  Bowel sounds are normoactive.  No CVA tenderness.  Extremities: No cyanosis, no edema or clubbing.  Pulses are +2.  Full range of motion is noted of the extremities without deformities.  No tenderness.  Neurologically intact.       ED Course     Labs Reviewed   COMP METABOLIC PANEL (14) - Abnormal; Notable for the following components:       Result Value    Calculated Osmolality 298 (*)     A/G Ratio 0.9 (*)     All other components within normal limits   POCT GLUCOSE - Abnormal; Notable for the following components:    POC Glucose 46 (*)     All other components within normal limits   POCT GLUCOSE - Abnormal; Notable for the following components:    POC Glucose 46 (*)     All other components within normal limits   POCT GLUCOSE - Abnormal; Notable for the following components:    POC Glucose 64 (*)     All other components within normal limits   CBC W/ DIFFERENTIAL - Abnormal; Notable for the following components:    WBC 11.8 (*)     All other components within normal limits   LIPASE - Normal   TROPONIN I HIGH SENSITIVITY - Normal   POCT GLUCOSE - Normal   CBC WITH DIFFERENTIAL WITH PLATELET    Narrative:     The following orders were created for panel order CBC With Differential With Platelet.  Procedure                               Abnormality         Status                     ---------                               -----------         ------                     CBC W/ DIFFERENTIAL[[id number]]          Abnormal            Final result                 Please view results for these tests  on the individual orders.   RAINBOW DRAW LAVENDER   RAINBOW DRAW LIGHT GREEN   RAINBOW DRAW BLUE     EKG    Rate, intervals and axes as noted on EKG Report.  Rate: 69  Rhythm: Sinus Rhythm  Reading: Sinus rhythm with premature atrial complexes nonspecific ST-T wave changes noted           Narrative  PROCEDURE:  XR CHEST AP PORTABLE  (CPT=71045)     TECHNIQUE:  AP chest radiograph was obtained.     COMPARISON:  [facility] , XR, XR CHEST AP PORTABLE  (CPT=71045), 8/17/2021, 8:18 PM.  [facility] , XR, XR RIBS WITH CHEST (3 VIEWS), RIGHT  (CPT=71101), 3/16/2020, 7:07 PM.     INDICATIONS:  CP, sick for 2.5 weeks with diarrhea, vomiting and chills     PATIENT STATED HISTORY: (As transcribed by Technologist)  Patient offered no additional history at this time.                         Impression  CONCLUSION:    There is cardiomegaly with a tortuous thoracic aorta unchanged.  The vascularity a is normal.  No definite effusion.  The extreme right CP angle is not included on the image.  No pneumothorax, lymphadenopathy, pneumonia or lung mass.  Trace discoid atelectasis left CP angle and right lung base laterally.  Unremarkable bony structures.        LOCATION:  [facility]                 Dictated by (CST): David Mcdonald MD on 3/27/2024 at 6:33 PM      Finalized by (CST): David Mcdonald MD on 3/27/2024 at 6:34 PM             Specimen Collected: 03/27/24 18:34 Last             PROCEDURE:  CT ABDOMEN+PELVIS (CONTRAST ONLY) (CPT=74177)     COMPARISON:  [facility] , CT, CT ABDOMEN+PELVIS(CONTRAST ONLY)(CPT=74177), 7/15/2018, 5:35 PM.     INDICATIONS:  CP, sick for 2.5 weeks with diarrhea, vomiting and chills abdomonal pain assess for diverticulitis /colitis     TECHNIQUE:  CT scanning was performed from the dome of the diaphragm to the pubic symphysis with non-ionic intravenous contrast material. Post contrast coronal MPR imaging was performed.  Dose reduction techniques were used. Dose information is  transmitted to the ACR (American  College of Radiology) NRDR (National Radiology Data Registry) which includes the Dose Index Registry.     PATIENT STATED HISTORY:(As transcribed by Technologist)  chills nvd abd pain x 2 weeks      CONTRAST USED:  100cc of Isovue 370     FINDINGS:    LIVER:  No enlargement, atrophy, abnormal density, or significant focal lesion.    BILIARY:  There has been a previous cholecystectomy.  PANCREAS:  No lesion, fluid collection, ductal dilatation, or atrophy.    SPLEEN:  No enlargement or focal lesion.    KIDNEYS:  No mass, obstruction, or calcification.    ADRENALS:  No mass or enlargement.    AORTA/VASCULAR:  No aneurysm or dissection.    RETROPERITONEUM:  No mass or adenopathy.    BOWEL/MESENTERY:  There is diverticulosis of the colon.  There is no CT evidence of diverticulitis.  ABDOMINAL WALL:  There is postoperative change in the anterior abdominal wall with extensive mash in anterior abdominal wall consistent with prior hernia repair.  This is a stable finding.  URINARY BLADDER:  No visible focal wall thickening, lesion, or calculus.    PELVIC NODES:  No adenopathy.    PELVIC ORGANS:  No visible mass.  Pelvic organs appropriate for patient age.    BONES:  Deformity right superior and inferior pubic rami are consistent with healed fractures.  LUNG BASES:  No visible pulmonary or pleural disease.    OTHER:  Negative.                    Impression  CONCLUSION:    1. A specific etiology for abdominal pain is not evident.  2. There is diverticulosis of the colon without CT evidence of diverticulitis.  3. There is extensive postoperative change in the anterior abdominal wall consistent with previous hernia repair with mesh.          LOCATION:          Dictated by (CST): Vikram Dawson MD on 3/27/2024 at 7:29 PM      Finalized by (CST): Vikram Dawson MD on 3/27/2024 at 7:33 PM                MDM    Differential diagnose includes but is not limited to pancreatitis, cardiac ischemia, cholecystitis, bowel obstruction,  diverticulitis, bowel perforation, abscess, pneumothorax.  The patient had laboratories that were sent.  Her initial glucose on point-of-care was 46.  She was told not to have oral glucose because of her abdominal pain and we needed to assess for any acute intra-abdominal process that may account for symptoms.  The patient still wanted to take something orally and despite our medical suggestion she decided to take oral glucose.  Her metabolic panel showed a glucose of 82.  Metabolic panel was essentially normal.  CBC showed a Latson of 11,800 the rest of the CBC was normal.  Lipase was normal at 34.  Troponin was normal at 14.  Chest x-ray performed  I reviewed the x-rays and agree with the radiologist report that showed there is cardiomegaly with torturous thoracic aorta.  Unchanged from previous.  The extreme right costophrenic angle is not visualized.  There is no pneumothorax, lymphadenopathy or pneumonia.  No lung mass.  Trace discoid atelectasis of the left CP angle and right lung base laterally.  Unremarkable bony structures  CT scan was performed.  I reviewed the x-rays and agree with the radiologist report that showed no specific etiology for abdominal pain is evident.  There is diverticulosis of the colon without diverticulitis.  There is extensive postoperative changes in the anterior abdominal wall consistent with previous hernia repair with mesh.    The patient symptomology is due to an unknown etiology.  Her chest pain does not appear to be an acute cardiopulmonary cause of symptoms as well as her abdominal pain is due to an unknown etiology.  Continue to follow-up as an outpatient with primary care physician.  Her point-of-care glucose was 95 at the time of discharge.  Return if symptoms progress or worsen.  Follow-up with GI physicians as an outpatient.  Return if chest pain worsens if she has any episodes of syncope or if she has hemoptysis or fevers.  Note to Patient  The 21st Century Cures Act  makes medical notes like these available to patients in the interest of transparency. However, be advised this is a medical document and is intended as [id number] communication; it is written in medical language and may appear blunt, direct, or contain abbreviations or verbiage that are unfamiliar. Medical documents are intended to carry relevant information, facts as evident, and the clinical opinion of the practitioner.  Patient was evaluated and a screening exam was performed.   As a treating physician attending to the patient, I determined, within reasonable clinical confidence and prior to discharge, that an emergency medical condition was not or was no longer present.  There was no indication for further evaluation, treatment or admission on an emergency basis.  Comprehensive verbal and written discharge and follow-up instructions were provided to help prevent relapse or worsening.  Patient was instructed to follow-up with their primary care provider for further evaluation and treatment, but to return immediately to the ER for worsening, concerning, new, changing or persisting symptoms.  I discussed the case with the patient and they had no questions, complaints, or concerns.  Patient felt comfortable going home.     ^^Please note that this report has been produced using speech recognition software and may contain errors related to that system including, but not limited to, errors in grammar, punctuation, and spelling, as well as words and phrases that possibly may have been recognized inappropriately.  If there are any questions or concerns, contact the dictating provider for clarification.  Nonspecific abdominal pain, nonspecific chest pain.            Medical Decision Making      Disposition and Plan     Clinical Impression:  1. Chest pain of uncertain etiology    2. Abdominal pain of unknown etiology         Disposition:  Discharge  3/27/2024  7:58 pm    Follow-up:  Georges Eduardo MD  [address]  [facility] [address]  [phone]    Schedule an appointment as soon as possible for a visit in 2 day(s)            Medications Prescribed:  Current Discharge Medication List

## 2024-03-31 LAB
ATRIAL RATE: 69 BPM
P AXIS: 86 DEGREES
P-R INTERVAL: 176 MS
Q-T INTERVAL: 392 MS
QRS DURATION: 98 MS
QTC CALCULATION (BEZET): 420 MS
R AXIS: 37 DEGREES
T AXIS: 0 DEGREES
VENTRICULAR RATE: 69 BPM

## 2024-04-25 NOTE — PAT NURSING NOTE
Per the hospital protocol, pt notified to hold Mounjaro for 7 days prior to scheduled procedure and to call PCP about holding her Eliquis.

## 2024-05-01 ENCOUNTER — HOSPITAL ENCOUNTER (OUTPATIENT)
Facility: HOSPITAL | Age: 69
Setting detail: HOSPITAL OUTPATIENT SURGERY
Discharge: HOME | End: 2024-05-01
Attending: INTERNAL MEDICINE | Admitting: INTERNAL MEDICINE
Payer: MEDICARE

## 2024-05-01 VITALS
HEART RATE: 67 BPM | HEIGHT: 65 IN | RESPIRATION RATE: 10 BRPM | OXYGEN SATURATION: 100 % | SYSTOLIC BLOOD PRESSURE: 142 MMHG | BODY MASS INDEX: 48.82 KG/M2 | WEIGHT: 293 LBS | DIASTOLIC BLOOD PRESSURE: 61 MMHG

## 2024-05-01 DIAGNOSIS — Z86.010 HISTORY OF COLON POLYPS: ICD-10-CM

## 2024-05-01 DIAGNOSIS — Z12.11 SPECIAL SCREENING FOR MALIGNANT NEOPLASM OF COLON: ICD-10-CM

## 2024-05-01 LAB — GLUCOSE BLDC GLUCOMTR-MCNC: 120 MG/DL (ref 70–99)

## 2024-05-01 PROCEDURE — 82962 GLUCOSE BLOOD TEST: CPT

## 2024-05-01 PROCEDURE — 88305 TISSUE EXAM BY PATHOLOGIST: CPT | Performed by: INTERNAL MEDICINE

## 2024-05-01 PROCEDURE — 0DBL8ZX EXCISION OF TRANSVERSE COLON, VIA NATURAL OR ARTIFICIAL OPENING ENDOSCOPIC, DIAGNOSTIC: ICD-10-PCS | Performed by: INTERNAL MEDICINE

## 2024-05-01 PROCEDURE — 0DBM8ZX EXCISION OF DESCENDING COLON, VIA NATURAL OR ARTIFICIAL OPENING ENDOSCOPIC, DIAGNOSTIC: ICD-10-PCS | Performed by: INTERNAL MEDICINE

## 2024-05-01 PROCEDURE — 3E0H8GC INTRODUCTION OF OTHER THERAPEUTIC SUBSTANCE INTO LOWER GI, VIA NATURAL OR ARTIFICIAL OPENING ENDOSCOPIC: ICD-10-PCS | Performed by: INTERNAL MEDICINE

## 2024-05-01 NOTE — DISCHARGE INSTRUCTIONS
ENDOSCOPY DISCHARGE INSTRUCTIONS    Procedure Performed:   Colonoscopy and Polypectomy    Endoscopist: No name on file  FINDINGS:   Internal/external hemorrhoids, Diverticulosis (pockets in colon that develop with age and lack of fiber intake), and Colon polyp(s) (a growth in the colon)    MEDICATIONS:  You may resume all other medications today    DIET:  High fiber/low fat diet    BIOPSIES:  Biopsy results will be released to you as soon as they are available as is now the law. This will not allow your physician the opportunity to go over these before they are released to you. It is not necessary to contact the office for explanation of these results. Your physician will contact you within a few business days of their release to review the findings with you    X-RAYS/LABS:   No X-rays/Labs were ordered today    ADDITIONAL RECOMMENDATIONS:        Activity for remainder of today:    REST TODAY  DO NOT drive or operate heavy machinery  DO NOT drink any alcoholic beverages  DO NOT sign any legal documents or make any important decisions    After your procedure(s):  It is not unusual to feel bloated or gassy .  Passing gas and belching is encouraged. Lying on your left side with your knees flexed may relieve the discomfort. A hot pack to the abdomen may also help.    After your gastroscopy (upper endoscopy): You may experience a slight sore throat which will subside. Throat lozenges or salt water gargle can be used.    FOLLOW-UP:  Contact the office at [phone] for follow-up appointment is needed or if you develop any of the following:    Severe abdominal pain/discomfort     Excessive bleeding                     Black tarry stool    Difficulty breathing/swallowing      Persistent nausea/vomiting  Fever above 100 degrees or chills

## 2024-05-01 NOTE — OPERATIVE REPORT
COLONOSCOPY REPORT    Patient Name:  Shea Hightower  Medical Record #: [id number]  YOB: 1955  Date of Procedure: 5/1/2024    Referring physician: Georges Eduardo MD    Surgeon:  Michael Sorto MD    Pre-op diagnosis: Colon cancer screening: History of polyps  (Last colonoscopy 7 years ago)  Post-op diagnosis: Diverticulosis, 4 polyps and internal hemorrhoids    Medications given:  none    Procedure:    After informed consent, discussion of risks and alternatives the patient was placed in the left lateral decubitus position and the high definition colonoscope was introduced into the rectum and advanced under direct visualization to the cecum which was identified by landmarks.  Bowel preparation was fair.  The mucosa was carefully inspected upon withdrawal of the scope.  Withdrawal time was 10 minutes.  ASA class was 2.    Findings:   The cecum was normal.  A 12 mm pedunculated polyp in the distal descending colon at 40 cm was hot snare excised and the site was tattooed.  3 small polyps ranging from 4 to 5 mm in diameter were cold snare excised from the hepatic flexure (2) and proximal descending colon.  There was moderate diverticulosis especially throughout the left colon.  There was no evidence of ulcerations, colitis, vascular anomalies or mass lesions.  Retroflexed view of the anus revealed large internal hemorrhoids. Digital rectal exam was normal.    Plan:  High fiber diet  Await biopsy results  Repeat colonoscopy timing will depend upon review of pathology    Specimens: polyps  EBL: minimal    Aronchick bowel prep scale:  5 Inadequate (repeat preparation needed)  4 Poor (semisolid stool could not be suctioned and <90% of mucosa seen)  3 Fair (semisolid stool could not be suctioned, but >90% of mucosa seen)  2 Good (clear liquid covering up to 25% of mucosa, but >90% of mucosa seen)  1 Excellent (>95% of mucosa seen)

## 2024-05-01 NOTE — H&P
RE-PROCEDURE UPDATE    HPI: Shea Hightower is a 69 year old female. 1955.    Patient presents for a colonoscopy.    ALLERGIES:   Allergies   Allergen Reactions    Abaloparatide OTHER (SEE COMMENTS) and RASH     Diffuse myalgias  Patient reports after taking tymlos she experienced a terrible itchy rash from the ankle to the middle of my chest and extreme bone pain in the left arm.  Diffuse myalgias      Red Pepper SWELLING    Denosumab PAIN and RASH     JOINT PAIN    Bactrim [Sulfamethoxazole W/Trimethoprim, Co-Trimoxazole] SWELLING     Eyelids swelled    Salsalate UNKNOWN    Adhesive Tape RASH    Cefadroxil RASH    Duricef [Cefadroxil Monohydrate] NAUSEA ONLY    Latex RASH     Cutaneous sxs only. Weakly + RAST    Latex OTHER (SEE COMMENTS), UNKNOWN and RASH     Cutaneous sxs only. Weakly + RAST         No current outpatient medications on file.     Past Medical History:    ANXIETY    Anxiety    Arrhythmia    Cataract    Depression    DIABETES    Diverticulosis of large intestine    Esophageal reflux    Heart palpitations    High blood pressure    High cholesterol    HYPERTENSION    IBS    IBS (irritable bowel syndrome)    OBESITY    Osteoarthrosis, unspecified whether generalized or localized, unspecified site    Pancreatitis (HCC)    ERCP induced    PONV (postoperative nausea and vomiting)    Type II or unspecified type diabetes mellitus without mention of complication, not stated as uncontrolled    Unspecified essential hypertension    Visual impairment    wears reading glasses     Past Surgical History:   Procedure Laterality Date                Carpal tunnel release Left     Cea, pancreatic cyst fluid      had pancreatic cyst surgery    Cholecystectomy      Colonoscopy      had multiple colonosopy's prior to  also    Colonoscopy N/A 2017    Procedure: COLONOSCOPY;  Surgeon: Michael Sorto MD;  Location: [facility] ENDOSCOPY    Correct bunion,othr methods Right      Correct bunion,othr methods Left 1996    Egd scope  6/07    Ercp,diagnostic  1992    Hernia surgery  1991    umbilical hernia     Hernia surgery  12/1992    abdominal hernia x 7    Knee replacement surgery      Other surgical history  12/03/2014    cysto Dr. Maddox    Other surgical history  1993    procedure to remove cyst from pancreas, developed abscess and required 2nd procedure    Other surgical history Left 1996    procedure to remove strep infection from left thigh    Other surgical history Bilateral 1999    bunionectomy    Other surgical history Bilateral 2003    hammertoe correction    Removal gallbladder      Removal of tonsils,14+ y/o      Repair ing hernia,7+y/o,reducibl      Splint, hammer toe Right 1998    Splint, hammer toe Left 1998    Surgery - external  1996    surgery d/t strep infection in R thigh    Tonsillectomy  1964    Total knee replacement Left 5/2015    Total knee replacement Right 7/2015    Upper gi endoscopy,exam  1992       PHYSICAL EXAM:  BP (!) 174/73 (BP Location: Left arm)   Pulse 60   Resp 18   Ht 5' 5\" (1.651 m)   Wt 295 lb (133.8 kg)   LMP 01/01/2009 (LMP Unknown)   SpO2 100%   BMI 49.09 kg/m²   LUNGS:   Clear to auscultation.  CARDIAC:   RRR, normal S1, S2; no S3 or murmur appreciated.  ABDOMEN:   Bowel sounds normoactive. Soft, no organomegaly or masses appreciated. Nontender.    IMPRESSION: Personal history of colon polyps      PLAN:  No significant changes except as outlined above.     Colonoscopy with possible biopsy/polypectomy--the planned endoscopic procedure, indications, risks, benefits and post-op precautions were discussed and questions were answered in the pre-operative area.     Michael Sorto MD

## (undated) DEVICE — 60 ML SYRINGE REGULAR TIP: Brand: MONOJECT

## (undated) DEVICE — MEDI-VAC NON-CONDUCTIVE SUCTION TUBING 6MM X 1.8M (6FT.) L: Brand: CARDINAL HEALTH

## (undated) DEVICE — STERILE LATEX POWDER-FREE SURGICAL GLOVESWITH NITRILE COATING: Brand: PROTEXIS

## (undated) DEVICE — Device: Brand: DUAL NARE NASAL CANNULAE FEMALE LUER CON 7FT O2 TUBE

## (undated) DEVICE — Device: Brand: DEFENDO AIR/WATER/SUCTION AND BIOPSY VALVE

## (undated) DEVICE — NEEDLE INJ 25GA CATH 230CM CHN 2.8MM ACUJECT

## (undated) DEVICE — KIT ENDO ORCAPOD 160/180/190

## (undated) DEVICE — SNARE CAPTIFLEX MICRO-OVL OLY

## (undated) DEVICE — REM POLYHESIVE ADULT PATIENT RETURN ELECTRODE: Brand: VALLEYLAB

## (undated) DEVICE — MARKER ENDOSCP TISS TATTOO C BLK SUSP PREFIL

## (undated) DEVICE — ENDOSCOPY PACK - LOWER: Brand: MEDLINE INDUSTRIES, INC.

## (undated) DEVICE — TRAP POLYP W/ 2 SPEC TY CLR MAGNIFYING WIND

## (undated) DEVICE — LASSO POLYPECTOMY SNARE: Brand: LASSO

## (undated) DEVICE — KIT CLEAN ENDOKIT 1.1OZ GOWNX2

## (undated) NOTE — IP AVS SNAPSHOT
[facility] [place] [address] [place] [address], [address] ~ [phone]                Discharge Summary   1/15/2017    Mariana Morrison           Admission Information        Provider Department    1/15/2017 Rula Calixto MD  8ne-A         Thank Commonly known as:  ERGOCALCIFEROL   Next dose due:  1/17/17 or as prior to admission. Take by mouth twice a week.  On Tuesdays & Thursdays                            Insulin Lispro 100 UNIT/ML Soln   Commonly known as:  HUMALOG   Next dose due:  1/1 record and bring to the appointment with your Doctor  Please seek medical attention for glucose less than 70 mg/dL or greater than 350 mg/dL    Notify physician if you experience any of the followin. Fever  2. . persistent Nausea/vomiting  3. severe INFLUENZA 9/15/2015, 9/29/2011, 9/22/2010, 10/1/2006    Pneumovax 23 (Lot Mgr)  Deferred ()    TDAP 12/30/2013    Zoster (Shingles) 2/16/2012      Recent Hematology Lab Results  (Last 3 results in the past 90 days)    WBC RBC Hemoglobin Hematocrit MCV [facility] We are concerned for your overall well being:    - If you are a smoker or have smoked in the last 12 months, we encourage you to explore options for quitting.     - If you have concerns related to behavioral health issues or thoughts of harming yourself, call your provider or healthcare team if you have any questions regarding your medications while at home.          Blood Pressure and Cardiac Medications     LOSARTAN POTASSIUM-HCTZ 100-25 MG Oral Tab         Use: Treat abnormal blood pressure (high or low) Use:  Nausea/vomiting, acid reflux, low bowel motility, stomach pain   Most common side effects:  Depends on the specific medication but generally include: diarrhea, constipation, headache, allergic reaction (itching, rash, hives)   What to report to your

## (undated) NOTE — IP AVS SNAPSHOT
[facility]    [address], [place], [place] ~ [phone]                Discharge Summary   2/6/2017    [facility]           Admission Information        Provider Department    2/6/2017 Junior Diaz MD [facility] Discharge References/Attachments     COLON AND RECTAL POLYPS, UNDERSTANDING (ENGLISH)    DIVERTICULOSIS AND DIVERTICULITIS, UNDERSTANDING (ENGLISH)    HEMORRHOIDS, UNDERSTANDING (ENGLISH)      Instructions and Information about Your Health     None      Fu ALT Bilirubin,Total Total Protein Albumin Sodium Potassium Chloride    (01/15/17)  14 (01/15/17)  0.2 (01/15/17)  7.2 (01/15/17)  3.6 (01/16/17)  143 (01/16/17)  3.9 (01/16/17)  110      Radiology Exams     None         Additional Information       We are

## (undated) NOTE — ED AVS SNAPSHOT
Sy Sneed   MRN: [id number]    Department:  [facility] Emergency Department   Date of Visit:  3/16/2020           Disclosure     Insurance plans vary and the physician(s) referred by the ER may not be covered by your plan.  Please contact your in tell this physician (or your personal doctor if your instructions are to return to your personal doctor) about any new or lasting problems. The primary care or specialist physician will see patients referred from the [facility] Emergency Department.  Wing Martini

## (undated) NOTE — ED AVS SNAPSHOT
David Phan   MRN: [id number]    Department:  [facility] Emergency Department   Date of Visit:  11/16/2017           Disclosure     Insurance plans vary and the physician(s) referred by the ER may not be covered by your plan.  Please contact your i If you have been prescribed any medication(s), please fill your prescription right away and begin taking the medication(s) as directed    If the emergency physician has read X-rays, these will be re-interpreted by a radiologist.  If there is a significant

## (undated) NOTE — LETTER
10/30/2020        [address] [facility] [address]          Dear Sanaz Denise,       Here are your results from your recent visit with Gastroenterology. C Diff is negative.       Tried to call you at 3:30pm Friday but your cell phone would

## (undated) NOTE — ED AVS SNAPSHOT
Minerva Wright   MRN: [id number]    Department:  [facility] Emergency Department   Date of Visit:  8/26/2019           Disclosure     Insurance plans vary and the physician(s) referred by the ER may not be covered by your plan.  Please contact your in tell this physician (or your personal doctor if your instructions are to return to your personal doctor) about any new or lasting problems. The primary care or specialist physician will see patients referred from the [facility] Emergency Department.  Wing Martini

## (undated) NOTE — LETTER
[facility]  [address], [place], [place]  Authorization for Surgical Operation and Procedure                                                                                           I hereby authorize Michael Sorto MD, my physician and his/her assistants (if applicable), which may include medical students, residents, and/or fellows, to perform the following surgical operation/ procedure and administer such anesthesia as may be determined necessary by my physician: Operation/Procedure name (s) COLONOSCOPY on Shea Hightower   2.   I recognize that during the surgical operation/procedure, unforeseen conditions may necessitate additional or different procedures than those listed above.  I, therefore, further authorize and request that the above-named surgeon, assistants, or designees perform such procedures as are, in their judgment, necessary and desirable.    3.   My surgeon/physician has discussed prior to my surgery the potential benefits, risks and side effects of this procedure; the likelihood of achieving goals; and potential problems that might occur during recuperation.  They also discussed reasonable alternatives to the procedure, including risks, benefits, and side effects related to the alternatives and risks related to not receiving this procedure.  I have had all my questions answered and I acknowledge that no guarantee has been made as to the result that may be obtained.    4.   Should the need arise during my operation/procedure, which includes change of level of care prior to discharge, I also consent to the administration of blood and/or blood products.  Further, I understand that despite careful testing and screening of blood or blood products by collecting agencies, I may still be subject to ill effects as a result of receiving a blood transfusion and/or blood products.  The following are some, but not all, of the potential risks that can occur: fever and allergic reactions,  hemolytic reactions, transmission of diseases such as Hepatitis, AIDS and Cytomegalovirus (CMV) and fluid overload.  In the event that I wish to have an autologous transfusion of my own blood, or a directed donor transfusion, I will discuss this with my physician.  Check only if Refusing Blood or Blood Products  I understand refusal of blood or blood products as deemed necessary by my physician may have serious consequences to my condition to include possible death. I hereby assume responsibility for my refusal and release the hospital, its personnel, and my physicians from any responsibility for the consequences of my refusal.    o  Refuse   5.   I authorize the use of any specimen, organs, tissues, body parts or foreign objects that may be removed from my body during the operation/procedure for diagnosis, research or teaching purposes and their subsequent disposal by hospital authorities.  I also authorize the release of specimen test results and/or written reports to my treating physician on the hospital medical staff or other referring or consulting physicians involved in my care, at the discretion of the Pathologist or my treating physician.    6.   I consent to the photographing or videotaping of the operations or procedures to be performed, including appropriate portions of my body for medical, scientific, or educational purposes, provided my identity is not revealed by the pictures or by descriptive texts accompanying them.  If the procedure has been photographed/videotaped, the surgeon will obtain the original picture, image, videotape or CD.  The hospital will not be responsible for storage, release or maintenance of the picture, image, tape or CD.    7.   I consent to the presence of a  or observers in the operating room as deemed necessary by my physician or their designees.    8.   I recognize that in the event my procedure results in extended X-Ray/fluoroscopy time, I may develop a  skin reaction.    9. If I have a Do Not Attempt Resuscitation (DNAR) order in place, that status will be suspended while in the operating room, procedural suite, and during the recovery period unless otherwise explicitly stated by me (or a person authorized to consent on my behalf). The surgeon or my attending physician will determine when the applicable recovery period ends for purposes of reinstating the DNAR order.  10. Patients having a sterilization procedure: I understand that if the procedure is successful the results will be permanent and it will therefore be impossible for me to inseminate, conceive, or bear children.  I also understand that the procedure is intended to result in sterility, although the result has not been guaranteed.   11. I acknowledge that my physician has explained sedation/analgesia administration to me including the risk and benefits I consent to the administration of sedation/analgesia as may be necessary or desirable in the judgment of my physician.    I CERTIFY THAT I HAVE READ AND FULLY UNDERSTAND THE ABOVE CONSENT TO OPERATION and/or OTHER PROCEDURE.     _________________________________________ _________________________________     ___________________________________  Signature of Patient     Signature of Responsible Person                   Printed Name of Responsible Person                              _________________________________________ ______________________________        ___________________________________  Signature of Witness         Date  Time         Relationship to Patient    STATEMENT OF PHYSICIAN My signature below affirms that prior to the time of the procedure; I have explained to the patient and/or his/her legal representative, the risks and benefits involved in the proposed treatment and any reasonable alternative to the proposed treatment. I have also explained the risks and benefits involved in refusal of the proposed treatment and alternatives to the  proposed treatment and have answered the patient's questions. If I have a significant financial interest in a co-management agreement or a significant financial interest in any product or implant, or other significant relationship used in this procedure/surgery, I have disclosed this and had a discussion with my patient.     _______________________________________________________________ _____________________________  (Signature of Physician)                                                                                         (Date)                                   (Time)  Patient Name: Shea Hightower    : 1955   Printed: 2024      Medical Record #: [id number]                                              Page 1 of

## (undated) NOTE — ED AVS SNAPSHOT
Shanon Mclean   MRN: [id number]    Department:  [facility] Emergency Department   Date of Visit:  9/13/2017           Disclosure     Insurance plans vary and the physician(s) referred by the ER may not be covered by your plan.  Please contact your in If you have been prescribed any medication(s), please fill your prescription right away and begin taking the medication(s) as directed    If the emergency physician has read X-rays, these will be re-interpreted by a radiologist.  If there is a significant

## (undated) NOTE — ED AVS SNAPSHOT
Anastasiya Gillespie   MRN: [id number]    Department:  [facility] Emergency Department   Date of Visit:  7/15/2018           Disclosure     Insurance plans vary and the physician(s) referred by the ER may not be covered by your plan.  Please contact your in tell this physician (or your personal doctor if your instructions are to return to your personal doctor) about any new or lasting problems. The primary care or specialist physician will see patients referred from the [facility] Emergency Department.  Rick Lopez